# Patient Record
Sex: FEMALE | Race: OTHER | NOT HISPANIC OR LATINO | URBAN - METROPOLITAN AREA
[De-identification: names, ages, dates, MRNs, and addresses within clinical notes are randomized per-mention and may not be internally consistent; named-entity substitution may affect disease eponyms.]

---

## 2017-02-22 ENCOUNTER — OUTPATIENT (OUTPATIENT)
Dept: OUTPATIENT SERVICES | Facility: HOSPITAL | Age: 62
LOS: 1 days | End: 2017-02-22

## 2017-02-22 VITALS
WEIGHT: 169.98 LBS | HEIGHT: 61 IN | HEART RATE: 72 BPM | RESPIRATION RATE: 16 BRPM | DIASTOLIC BLOOD PRESSURE: 78 MMHG | TEMPERATURE: 97 F | SYSTOLIC BLOOD PRESSURE: 130 MMHG

## 2017-02-22 DIAGNOSIS — K80.50 CALCULUS OF BILE DUCT WITHOUT CHOLANGITIS OR CHOLECYSTITIS WITHOUT OBSTRUCTION: ICD-10-CM

## 2017-02-22 DIAGNOSIS — Z98.891 HISTORY OF UTERINE SCAR FROM PREVIOUS SURGERY: Chronic | ICD-10-CM

## 2017-02-22 DIAGNOSIS — K21.9 GASTRO-ESOPHAGEAL REFLUX DISEASE WITHOUT ESOPHAGITIS: ICD-10-CM

## 2017-02-22 LAB
BASOPHILS # BLD AUTO: 0.03 K/UL — SIGNIFICANT CHANGE UP (ref 0–0.2)
BASOPHILS NFR BLD AUTO: 0.4 % — SIGNIFICANT CHANGE UP (ref 0–2)
BLD GP AB SCN SERPL QL: NEGATIVE — SIGNIFICANT CHANGE UP
BUN SERPL-MCNC: 15 MG/DL — SIGNIFICANT CHANGE UP (ref 7–23)
CALCIUM SERPL-MCNC: 8.7 MG/DL — SIGNIFICANT CHANGE UP (ref 8.4–10.5)
CHLORIDE SERPL-SCNC: 103 MMOL/L — SIGNIFICANT CHANGE UP (ref 98–107)
CO2 SERPL-SCNC: 27 MMOL/L — SIGNIFICANT CHANGE UP (ref 22–31)
CREAT SERPL-MCNC: 0.63 MG/DL — SIGNIFICANT CHANGE UP (ref 0.5–1.3)
EOSINOPHIL # BLD AUTO: 0.28 K/UL — SIGNIFICANT CHANGE UP (ref 0–0.5)
EOSINOPHIL NFR BLD AUTO: 3.4 % — SIGNIFICANT CHANGE UP (ref 0–6)
GLUCOSE SERPL-MCNC: 95 MG/DL — SIGNIFICANT CHANGE UP (ref 70–99)
HCT VFR BLD CALC: 39.2 % — SIGNIFICANT CHANGE UP (ref 34.5–45)
HGB BLD-MCNC: 12.6 G/DL — SIGNIFICANT CHANGE UP (ref 11.5–15.5)
IMM GRANULOCYTES NFR BLD AUTO: 0.1 % — SIGNIFICANT CHANGE UP (ref 0–1.5)
LYMPHOCYTES # BLD AUTO: 2.6 K/UL — SIGNIFICANT CHANGE UP (ref 1–3.3)
LYMPHOCYTES # BLD AUTO: 31.8 % — SIGNIFICANT CHANGE UP (ref 13–44)
MCHC RBC-ENTMCNC: 26.3 PG — LOW (ref 27–34)
MCHC RBC-ENTMCNC: 32.1 % — SIGNIFICANT CHANGE UP (ref 32–36)
MCV RBC AUTO: 81.8 FL — SIGNIFICANT CHANGE UP (ref 80–100)
MONOCYTES # BLD AUTO: 0.72 K/UL — SIGNIFICANT CHANGE UP (ref 0–0.9)
MONOCYTES NFR BLD AUTO: 8.8 % — SIGNIFICANT CHANGE UP (ref 2–14)
NEUTROPHILS # BLD AUTO: 4.54 K/UL — SIGNIFICANT CHANGE UP (ref 1.8–7.4)
NEUTROPHILS NFR BLD AUTO: 55.5 % — SIGNIFICANT CHANGE UP (ref 43–77)
PLATELET # BLD AUTO: 287 K/UL — SIGNIFICANT CHANGE UP (ref 150–400)
PMV BLD: 9 FL — SIGNIFICANT CHANGE UP (ref 7–13)
POTASSIUM SERPL-MCNC: 4.1 MMOL/L — SIGNIFICANT CHANGE UP (ref 3.5–5.3)
POTASSIUM SERPL-SCNC: 4.1 MMOL/L — SIGNIFICANT CHANGE UP (ref 3.5–5.3)
RBC # BLD: 4.79 M/UL — SIGNIFICANT CHANGE UP (ref 3.8–5.2)
RBC # FLD: 13.6 % — SIGNIFICANT CHANGE UP (ref 10.3–14.5)
RH IG SCN BLD-IMP: POSITIVE — SIGNIFICANT CHANGE UP
SODIUM SERPL-SCNC: 143 MMOL/L — SIGNIFICANT CHANGE UP (ref 135–145)
WBC # BLD: 8.18 K/UL — SIGNIFICANT CHANGE UP (ref 3.8–10.5)
WBC # FLD AUTO: 8.18 K/UL — SIGNIFICANT CHANGE UP (ref 3.8–10.5)

## 2017-02-22 RX ORDER — PANTOPRAZOLE SODIUM 20 MG/1
1 TABLET, DELAYED RELEASE ORAL
Qty: 0 | Refills: 0 | COMMUNITY

## 2017-02-22 RX ORDER — SODIUM CHLORIDE 9 MG/ML
1000 INJECTION, SOLUTION INTRAVENOUS
Qty: 0 | Refills: 0 | Status: DISCONTINUED | OUTPATIENT
Start: 2017-03-03 | End: 2017-03-03

## 2017-02-22 RX ORDER — ERGOCALCIFEROL 1.25 MG/1
1 CAPSULE ORAL
Qty: 0 | Refills: 0 | COMMUNITY

## 2017-02-22 RX ORDER — OMALIZUMAB 150 MG/ML
0 INJECTION, SOLUTION SUBCUTANEOUS
Qty: 0 | Refills: 0 | COMMUNITY

## 2017-02-22 RX ORDER — FAMOTIDINE 10 MG/ML
1 INJECTION INTRAVENOUS
Qty: 0 | Refills: 0 | COMMUNITY

## 2017-02-22 NOTE — H&P PST ADULT - PMH
Calculus of bile duct w/o obstruction, cholangitis, or cholecystitis    GERD (gastroesophageal reflux disease)

## 2017-02-22 NOTE — H&P PST ADULT - NEGATIVE OPHTHALMOLOGIC SYMPTOMS
no blurred vision L/no pain R/no discharge R/no diplopia/no irritation L/no lacrimation L/no loss of vision R/no lacrimation R/no irritation R/no loss of vision L/no photophobia/no discharge L/no pain L/no blurred vision R

## 2017-02-22 NOTE — H&P PST ADULT - NEGATIVE NEUROLOGICAL SYMPTOMS
no transient paralysis/no syncope/no headache/no weakness/no loss of consciousness/no focal seizures/no paresthesias/no tremors/no vertigo/no facial palsy/no confusion/no generalized seizures/no loss of sensation/no hemiparesis/no difficulty walking

## 2017-02-22 NOTE — H&P PST ADULT - NEGATIVE MUSCULOSKELETAL SYMPTOMS
no neck pain/no joint swelling/no back pain/no muscle cramps/no arm pain L/no arthritis/no stiffness/no arthralgia/no myalgia/no arm pain R/no leg pain L/no leg pain R/no muscle weakness

## 2017-02-22 NOTE — H&P PST ADULT - FAMILY HISTORY
Father  Still living? Yes, Estimated age: Age Unknown  Family history of diabetes mellitus, Age at diagnosis: Age Unknown

## 2017-02-22 NOTE — H&P PST ADULT - HISTORY OF PRESENT ILLNESS
62 yo female with PMH of GERD .  Pt states she started to experience abdominal pain Jan 2016.  Endoscopy done Jan 2016, showed gastritis.  Abdominal ultrasound done, showed multiple stones in bile duct.   Pt scheduled for laparoscopic cholecystectomy, possible open for biliary on 3/3/17. 60 yo female with PMH of GERD presents to pre surgical testing.  Pt states she started to experience abdominal pain Jan 2016.  Went to GI doctor, endoscopy done Jan 2016, showed gastritis.  Abdominal ultrasound done, showed multiple stones in bile duct.  Pt scheduled for laparoscopic cholecystectomy, possible open for biliary on 3/3/17.

## 2017-02-22 NOTE — H&P PST ADULT - NEGATIVE ENMT SYMPTOMS
no dysphagia/no tinnitus/no ear pain/no throat pain/no vertigo/no abnormal taste sensation/no hearing difficulty/no gum bleeding/no dry mouth/no nasal obstruction

## 2017-02-22 NOTE — H&P PST ADULT - MUSCULOSKELETAL
details… detailed exam no calf tenderness/no joint swelling/no joint erythema/normal strength/ROM intact/no joint warmth

## 2017-02-22 NOTE — H&P PST ADULT - PROBLEM SELECTOR PLAN 1
Pt scheduled for laparoscopic cholecystectomy, possible open for biliary on 3/3/17.  Pre op instructions including chlorhexidine wash given and pt able to verbalize understanding.

## 2017-02-22 NOTE — H&P PST ADULT - NEGATIVE CARDIOVASCULAR SYMPTOMS
no chest pain/no peripheral edema/no orthopnea/no dyspnea on exertion/no claudication/no paroxysmal nocturnal dyspnea/no palpitations

## 2017-02-22 NOTE — H&P PST ADULT - RS GEN PE MLT RESP DETAILS PC
no wheezes/no intercostal retractions/no rales/airway patent/no subcutaneous emphysema/no chest wall tenderness/respirations non-labored/no rhonchi/breath sounds equal/clear to auscultation bilaterally/good air movement

## 2017-02-22 NOTE — H&P PST ADULT - NSANTHOSAYNRD_GEN_A_CORE
No. ARVIND screening performed.  STOP BANG Legend: 0-2 = LOW Risk; 3-4 = INTERMEDIATE Risk; 5-8 = HIGH Risk

## 2017-02-23 LAB
ALBUMIN SERPL ELPH-MCNC: 3.9 G/DL — SIGNIFICANT CHANGE UP (ref 3.3–5)
ALP SERPL-CCNC: 108 U/L — SIGNIFICANT CHANGE UP (ref 40–120)
ALT FLD-CCNC: 18 U/L — SIGNIFICANT CHANGE UP (ref 4–33)
AST SERPL-CCNC: 22 U/L — SIGNIFICANT CHANGE UP (ref 4–32)
BILIRUB DIRECT SERPL-MCNC: 0.1 MG/DL — SIGNIFICANT CHANGE UP (ref 0.1–0.2)
BILIRUB SERPL-MCNC: 0.6 MG/DL — SIGNIFICANT CHANGE UP (ref 0.2–1.2)
PROT SERPL-MCNC: 7.3 G/DL — SIGNIFICANT CHANGE UP (ref 6–8.3)

## 2017-03-02 ENCOUNTER — RESULT REVIEW (OUTPATIENT)
Age: 62
End: 2017-03-02

## 2017-03-03 ENCOUNTER — OUTPATIENT (OUTPATIENT)
Dept: OUTPATIENT SERVICES | Facility: HOSPITAL | Age: 62
LOS: 1 days | Discharge: HOME | End: 2017-03-03
Payer: COMMERCIAL

## 2017-03-03 ENCOUNTER — TRANSCRIPTION ENCOUNTER (OUTPATIENT)
Age: 62
End: 2017-03-03

## 2017-03-03 VITALS
TEMPERATURE: 98 F | RESPIRATION RATE: 18 BRPM | HEART RATE: 73 BPM | OXYGEN SATURATION: 97 % | SYSTOLIC BLOOD PRESSURE: 127 MMHG | DIASTOLIC BLOOD PRESSURE: 59 MMHG

## 2017-03-03 VITALS
SYSTOLIC BLOOD PRESSURE: 129 MMHG | HEIGHT: 60 IN | HEART RATE: 78 BPM | TEMPERATURE: 98 F | DIASTOLIC BLOOD PRESSURE: 63 MMHG | WEIGHT: 169.98 LBS | OXYGEN SATURATION: 96 % | RESPIRATION RATE: 12 BRPM

## 2017-03-03 DIAGNOSIS — Z98.891 HISTORY OF UTERINE SCAR FROM PREVIOUS SURGERY: Chronic | ICD-10-CM

## 2017-03-03 DIAGNOSIS — K80.50 CALCULUS OF BILE DUCT WITHOUT CHOLANGITIS OR CHOLECYSTITIS WITHOUT OBSTRUCTION: ICD-10-CM

## 2017-03-03 LAB — RH IG SCN BLD-IMP: POSITIVE — SIGNIFICANT CHANGE UP

## 2017-03-03 PROCEDURE — 88304 TISSUE EXAM BY PATHOLOGIST: CPT | Mod: 26

## 2017-03-03 RX ORDER — SODIUM CHLORIDE 9 MG/ML
1000 INJECTION, SOLUTION INTRAVENOUS
Qty: 0 | Refills: 0 | Status: DISCONTINUED | OUTPATIENT
Start: 2017-03-03 | End: 2017-03-04

## 2017-03-03 RX ORDER — ACETAMINOPHEN WITH CODEINE 300MG-30MG
1 TABLET ORAL
Qty: 12 | Refills: 0 | OUTPATIENT
Start: 2017-03-03 | End: 2017-03-06

## 2017-03-03 RX ORDER — OXYCODONE HYDROCHLORIDE 5 MG/1
1 TABLET ORAL
Qty: 15 | Refills: 0 | OUTPATIENT
Start: 2017-03-03

## 2017-03-03 RX ADMIN — SODIUM CHLORIDE 100 MILLILITER(S): 9 INJECTION, SOLUTION INTRAVENOUS at 11:09

## 2017-03-03 NOTE — ASU DISCHARGE PLAN (ADULT/PEDIATRIC). - MEDICATION SUMMARY - MEDICATIONS TO TAKE
I will START or STAY ON the medications listed below when I get home from the hospital:    Tylenol with Codeine #3 300 mg-30 mg oral tablet  -- 1 tab(s) by mouth every 6 hours MDD:4  -- Caution federal law prohibits the transfer of this drug to any person other  than the person for whom it was prescribed.  May cause drowsiness.  Alcohol may intensify this effect.  Use care when operating dangerous machinery.  This product contains acetaminophen.  Do not use  with any other product containing acetaminophen to prevent possible liver damage.  Using more of this medication than prescribed may cause serious breathing problems.    -- Indication: For Pain    famotidine 40 mg oral tablet  -- 1 tab(s) by mouth once a day in am  -- Indication: For GERD    Xolair 150 mg subcutaneous injection  --  subcutaneous once a month  -- Indication: For Immunosuppressive drug    pantoprazole 40 mg oral delayed release tablet  -- 1 tab(s) by mouth once a day in am  -- Indication: For GERD    multivitamin  -- 1 tab(s) by mouth once a day in am. Last dose 2/23/17  -- Indication: For Vitamin    Vitamin D2 50,000 intl units (1.25 mg) oral capsule  -- 1 cap(s) by mouth once a week in am on Wednesdays  -- Indication: For Vitamin

## 2017-03-03 NOTE — ASU DISCHARGE PLAN (ADULT/PEDIATRIC). - NURSING INSTRUCTIONS
You received intravenous tylenol in the operating room at 9:20am. You may take additional tylenol products after 3:20pm. You also received intravenous toradol (NSAID) in the operating room at 10:15am. You may take additional NSAIDs (advil/aleve/ibuprofen/motrin) after 4:15pm.

## 2017-03-03 NOTE — ASU DISCHARGE PLAN (ADULT/PEDIATRIC). - DRESSING FT
You may shower regularly after 24 hours. Leave steristrips on. You may shower regularly after 24 hours. Leave steri strips on.

## 2017-03-03 NOTE — ASU DISCHARGE PLAN (ADULT/PEDIATRIC). - NOTIFY
Pain not relieved by Medications/Fever greater than 101/Bleeding that does not stop/Persistent Nausea and Vomiting/Unable to Urinate

## 2017-03-09 LAB — SURGICAL PATHOLOGY STUDY: SIGNIFICANT CHANGE UP

## 2019-01-22 NOTE — ASU PATIENT PROFILE, ADULT - ABLE TO REACH PT
----- Message from Radha Allen sent at 1/22/2019  3:25 PM CST -----  Contact: [facility] Lorena [phone]  Lorena is calling to notify  that pt has a new wound on leg, and would like orders for wound care. Please call and advise.    yes

## 2020-07-24 NOTE — BRIEF OPERATIVE NOTE - SURGICAL END DATE/TIME
Results of Renal US for after biopsy resulted. Writer updated Nephrology PA.     [phone] DEMETRIUS Cervantes: Renal US showed small collection of Right kidney possibly from biopsy. Writer will monitor. Please advise if further interventions. JANE Murray    New orders placed.    03-Mar-2017

## 2022-09-23 PROBLEM — K80.50 CALCULUS OF BILE DUCT WITHOUT CHOLANGITIS OR CHOLECYSTITIS WITHOUT OBSTRUCTION: Chronic | Status: ACTIVE | Noted: 2017-02-22

## 2022-09-23 PROBLEM — K21.9 GASTRO-ESOPHAGEAL REFLUX DISEASE WITHOUT ESOPHAGITIS: Chronic | Status: ACTIVE | Noted: 2017-02-22

## 2022-10-14 ENCOUNTER — APPOINTMENT (OUTPATIENT)
Dept: OTOLARYNGOLOGY | Facility: CLINIC | Age: 67
End: 2022-10-14

## 2022-11-04 ENCOUNTER — APPOINTMENT (OUTPATIENT)
Dept: OTOLARYNGOLOGY | Facility: CLINIC | Age: 67
End: 2022-11-04

## 2023-11-17 ENCOUNTER — APPOINTMENT (OUTPATIENT)
Dept: PULMONOLOGY | Facility: CLINIC | Age: 68
End: 2023-11-17
Payer: MEDICARE

## 2023-11-17 VITALS
SYSTOLIC BLOOD PRESSURE: 144 MMHG | TEMPERATURE: 97.2 F | DIASTOLIC BLOOD PRESSURE: 70 MMHG | OXYGEN SATURATION: 98 % | WEIGHT: 171 LBS | BODY MASS INDEX: 32.7 KG/M2 | HEART RATE: 84 BPM | HEIGHT: 60.5 IN

## 2023-11-17 DIAGNOSIS — I10 ESSENTIAL (PRIMARY) HYPERTENSION: ICD-10-CM

## 2023-11-17 DIAGNOSIS — R06.02 SHORTNESS OF BREATH: ICD-10-CM

## 2023-11-17 DIAGNOSIS — K50.90 CROHN'S DISEASE, UNSPECIFIED, W/OUT COMPLICATIONS: ICD-10-CM

## 2023-11-17 DIAGNOSIS — R06.81 APNEA, NOT ELSEWHERE CLASSIFIED: ICD-10-CM

## 2023-11-17 DIAGNOSIS — R06.83 SNORING: ICD-10-CM

## 2023-11-17 PROCEDURE — 99204 OFFICE O/P NEW MOD 45 MIN: CPT

## 2024-01-23 ENCOUNTER — APPOINTMENT (OUTPATIENT)
Dept: INTERNAL MEDICINE | Facility: CLINIC | Age: 69
End: 2024-01-23

## 2024-01-23 ENCOUNTER — APPOINTMENT (OUTPATIENT)
Dept: PULMONOLOGY | Facility: CLINIC | Age: 69
End: 2024-01-23

## 2024-04-22 ENCOUNTER — APPOINTMENT (OUTPATIENT)
Dept: PULMONOLOGY | Facility: CLINIC | Age: 69
End: 2024-04-22

## 2024-08-12 ENCOUNTER — APPOINTMENT (OUTPATIENT)
Dept: ORTHOPEDIC SURGERY | Facility: CLINIC | Age: 69
End: 2024-08-12
Payer: MEDICARE

## 2024-08-12 VITALS — HEIGHT: 61 IN | WEIGHT: 174 LBS | BODY MASS INDEX: 32.85 KG/M2

## 2024-08-12 DIAGNOSIS — M48.07 SPINAL STENOSIS, LUMBOSACRAL REGION: ICD-10-CM

## 2024-08-12 PROCEDURE — 99204 OFFICE O/P NEW MOD 45 MIN: CPT

## 2024-08-12 NOTE — ADDENDUM
[FreeTextEntry1] : This note was written by Adryan Blank on 08/12/2024 acting as scribe for Dr. Mohit Marquez M.D.  I, Mohit Marquez MD, have read and attest that all the information, medical decision making and discharge instructions within are true and accurate.

## 2024-08-12 NOTE — PHYSICAL EXAM
[Normal] : Gait: normal [Rose's Sign] : negative Rose's sign [Pronator Drift] : negative pronator drift [SLR] : negative straight leg raise [de-identified] : 5 out of 5 motor strength, sensation is intact and symmetrical full range of motion flexion extension and rotation, no palpatory tenderness full range of motion of hips knees shoulders and elbows (all four extremities), no atrophy, negative straight leg raise, no pathological reflexes, no swelling, normal ambulation, no apparent distress skin is intact, no palpable lymph nodes, no upper or lower extremity instability, alert and oriented x3 and normal mood. Normal finger-to nose test.  No upper motor neuron findings. [de-identified] : I reviewed, interpreted and clinically correlated the following outside imaging studies, MRI Lumbar 7/25/24  ([facility]) IMPRESSION:    Minimal lumbar levoscoliosis. Suspect subtle L4-5 spondylolisthesis.    Mid lumbar degenerative disc change.  Mid/lower lumbar degenerative facet disease.   Subtle left posterior L4-5 disc protrusion with caudal extension which abuts the left anterior thecal sac without definite nerve root involvement.   Moderate L4-5 spinal canal narrowing.   L4-5 foraminal disc extension with question of minimal right L4 nerve root compression.         CLINICAL INDICATION:  Chronic radiating low back pain.   MRI OF THE LUMBAR SPINE WITHOUT IV CONTRAST:   TECHNIQUE: Multiplanar multisequence MRI imaging of the lumbar spine was performed without the administration of intravenous contrast.    COMPARISON:  None.   FINDINGS: L4-5 is designated just below the iliac crest.   ALIGNMENT:  Suspect subtle L4-5 spondylolisthesis. Minimal lumbar levoscoliosis.   VERTEBRAE:   Vertebral height is preserved. Subtle type I Modic signal at T10-11, T11-12. Slight type II Modic signal at T12-L1. Small vertebral body hemangiomas at L1, L5, S1. The posterior elements are intact.   DISCS:  Loss of disc height at T11-12, L4-5. Suspect disc desiccation at T11-12, L2-3 - L4-5.   CONUS MEDULLARIS AND CAUDA EQUINA:  The conus tip is seen at T12. Normal distal cord. No intradural abnormality.   PARAVERTEBRAL SOFT TISSUES AND VISUALIZED RETROPERITONEUM:  No paraspinal abnormality.   EVALUATION OF INDIVIDUAL LEVELS DEMONSTRATES:    T10-11:  No spinal canal or neural foraminal stenosis.   T11-12:  Central T11-12 disc protrusion. Minimal anterior thecal sac effacement.   T12-L1, L1-2:   No spinal canal or neuroforaminal stenosis.   L2-3:   Minimal central L2-3 disc bulge. Minimal right L2-3 facet hypertrophy. L3-4:  Minimal central L3-4 disc bulge. Moderate left greater than right L3-4 facet hypertrophy.   L4-5:  Marked central L4-5 disc bulge abuts the anterior thecal sac. Slight asymmetric caudal extension, left side. Moderate bilateral facet hypertrophy. Consequent spinal canal narrowing. Bilateral foraminal disc extension with right greater than left foraminal narrowing.   L5-S1:  Intact L5-S1 disc. Minimal bilateral facet hypertrophy.   LIMITED EVALUATION OF UPPER SACRUM AND SACROILIAC JOINTS: Degenerative change, bilateral SI joints.

## 2024-08-12 NOTE — DISCUSSION/SUMMARY
[Medication Risks Reviewed] : Medication risks reviewed [Surgical risks reviewed] : Surgical risks reviewed [de-identified] : L4-5 moderate stenosis. L4-5 spondylolisthesis. Discussed all options. Referred to Dr. Dada Dupont for pain management. Discussed L4-5 decompression. Risks of surgery include infection, dural tear, nerve root injury, reherniation, future back pain, future leg pain, retained fragment, hematoma, urinary retention, worsening leg symptoms, footdrop, anesthetic risks, blood transfusion risks, positioning pain, visceral and vascular injury, deep vein thrombosis, pulmonary embolus, and death. All risks were explained not exclusive to the ones mentioned alternatives were discussed and all questions were answered the patient agrees and understands the above and is in complete agreement with the plan.  All options discussed including rest, medicine, home exercise, acupuncture, Chiropractic care, Physical Therapy, Pain management, and last resort surgery. All questions were answered, all alternatives discussed, and the patient is in complete agreement with the treatment plan which the patient contributed to and discussed with me through the shared decision-making process. Follow-up appointment as instructed. Any issues and the patient will call or come in sooner. Son agrees with plan.

## 2024-08-12 NOTE — HISTORY OF PRESENT ILLNESS
[Stable] : stable [de-identified] : 68 year old female presents for initial evaluation of lower back pain x 2 years, which has worsened.  She states she may have hurt her back while on a trip where she did a lot of excessive walking, standing and bending.  She states that the pain radiates down the bilateral lateral hips, down the B/L LE posteriorly to the feet, R>L.  Has tingling of both legs.  Walking and standing aggravates the pain.  Can not take NSAIDs due to Crohn's disease.  Takes tylenol for the pain.  Has tried PT in 2022 which helped at the time but temporarily.  Denies REX. Has MRI Lumbar. PMHx: HTN She is retired.  No fever, chills, sweats, nausea/vomiting. No bowel or bladder dysfunction, no recent weight loss or gain. No night pain. This history is in addition to the intake form that I personally reviewed.

## 2024-09-22 ENCOUNTER — NON-APPOINTMENT (OUTPATIENT)
Age: 69
End: 2024-09-22

## 2024-09-23 ENCOUNTER — APPOINTMENT (OUTPATIENT)
Dept: ORTHOPEDIC SURGERY | Facility: CLINIC | Age: 69
End: 2024-09-23
Payer: MEDICARE

## 2024-09-23 VITALS
DIASTOLIC BLOOD PRESSURE: 81 MMHG | OXYGEN SATURATION: 99 % | BODY MASS INDEX: 32.85 KG/M2 | SYSTOLIC BLOOD PRESSURE: 124 MMHG | HEART RATE: 83 BPM | HEIGHT: 61 IN | WEIGHT: 174 LBS

## 2024-09-23 DIAGNOSIS — M48.07 SPINAL STENOSIS, LUMBOSACRAL REGION: ICD-10-CM

## 2024-09-23 PROCEDURE — 99214 OFFICE O/P EST MOD 30 MIN: CPT

## 2024-09-23 NOTE — DISCUSSION/SUMMARY
[Medication Risks Reviewed] : Medication risks reviewed [Surgical risks reviewed] : Surgical risks reviewed [de-identified] : L4-5 moderate stenosis. L4-5 spondylolisthesis. Feeling better after REX with Dr. Dada Dupont. Discussed all options. F/U 8 weeks. She will see Dr. Dada Dupont again for pain management. Discussed L4-5 decompression, however she will avoid surgery as of now as she feels better after her 1st REX. Risks of surgery include infection, dural tear, nerve root injury, reherniation, future back pain, future leg pain, retained fragment, hematoma, urinary retention, worsening leg symptoms, footdrop, anesthetic risks, blood transfusion risks, positioning pain, visceral and vascular injury, deep vein thrombosis, pulmonary embolus, and death. All risks were explained not exclusive to the ones mentioned alternatives were discussed and all questions were answered the patient agrees and understands the above and is in complete agreement with the plan.  All options discussed including rest, medicine, home exercise, acupuncture, Chiropractic care, Physical Therapy, Pain management, and last resort surgery. All questions were answered, all alternatives discussed, and the patient is in complete agreement with the treatment plan which the patient contributed to and discussed with me through the shared decision-making process. Follow-up appointment as instructed. Any issues and the patient will call or come in sooner. Son agrees with plan.

## 2024-09-23 NOTE — HISTORY OF PRESENT ILLNESS
[Stable] : stable [de-identified] : 69 year old female presents for follow up evaluation of lower back pain x 2 years. She states she may have hurt her back while on a trip where she did a lot of excessive walking, standing and bending.  She states that the pain radiates down the bilateral lateral hips, down the B/L LE posteriorly to the feet, R>L.  Has tingling of both legs.  Walking and standing aggravates the pain.  Can not take NSAIDs due to Crohn's disease.  Takes tylenol for the pain.  Has tried PT in 2022 which helped at the time but temporarily.  Was referred to pain management at last visit. S/P LESI x on 8/29/24 with Dr. Dupont and states she felt improvement, currently her pain is 6-7/10.  She was also referred to PT by Dr. Dupont, she is going to start it next week. She was also recommended another LESI. Has MRI Lumbar. PMHx: HTN, Crohn's Disease  She is retired.  No fever, chills, sweats, nausea/vomiting. No bowel or bladder dysfunction, no recent weight loss or gain. No night pain. This history is in addition to the intake form that I personally reviewed.

## 2024-09-23 NOTE — ADDENDUM
[FreeTextEntry1] : This note was written by Adryan Blank on 09/23/2024 acting as scribe for Dr. Mohit Marquez M.D.  I, Mohit Marquez MD, have read and attest that all the information, medical decision making and discharge instructions within are true and accurate.

## 2024-09-23 NOTE — PHYSICAL EXAM
[Normal] : Gait: normal [Rose's Sign] : negative Rose's sign [Pronator Drift] : negative pronator drift [SLR] : negative straight leg raise [de-identified] : 5 out of 5 motor strength, sensation is intact and symmetrical full range of motion flexion extension and rotation, no palpatory tenderness full range of motion of hips knees shoulders and elbows (all four extremities), no atrophy, negative straight leg raise, no pathological reflexes, no swelling, normal ambulation, no apparent distress skin is intact, no palpable lymph nodes, no upper or lower extremity instability, alert and oriented x3 and normal mood. Normal finger-to nose test.  No upper motor neuron findings. [de-identified] : I reviewed, interpreted and clinically correlated the following outside imaging studies, MRI Lumbar 7/25/24  ([facility] [place]) IMPRESSION:    Minimal lumbar levoscoliosis. Suspect subtle L4-5 spondylolisthesis.    Mid lumbar degenerative disc change.  Mid/lower lumbar degenerative facet disease.   Subtle left posterior L4-5 disc protrusion with caudal extension which abuts the left anterior thecal sac without definite nerve root involvement.   Moderate L4-5 spinal canal narrowing.   L4-5 foraminal disc extension with question of minimal right L4 nerve root compression.         CLINICAL INDICATION:  Chronic radiating low back pain.   MRI OF THE LUMBAR SPINE WITHOUT IV CONTRAST:   TECHNIQUE: Multiplanar multisequence MRI imaging of the lumbar spine was performed without the administration of intravenous contrast.    COMPARISON:  None.   FINDINGS: L4-5 is designated just below the iliac crest.   ALIGNMENT:  Suspect subtle L4-5 spondylolisthesis. Minimal lumbar levoscoliosis.   VERTEBRAE:   Vertebral height is preserved. Subtle type I Modic signal at T10-11, T11-12. Slight type II Modic signal at T12-L1. Small vertebral body hemangiomas at L1, L5, S1. The posterior elements are intact.   DISCS:  Loss of disc height at T11-12, L4-5. Suspect disc desiccation at T11-12, L2-3 - L4-5.   CONUS MEDULLARIS AND CAUDA EQUINA:  The conus tip is seen at T12. Normal distal cord. No intradural abnormality.   PARAVERTEBRAL SOFT TISSUES AND VISUALIZED RETROPERITONEUM:  No paraspinal abnormality.   EVALUATION OF INDIVIDUAL LEVELS DEMONSTRATES:    T10-11:  No spinal canal or neural foraminal stenosis.   T11-12:  Central T11-12 disc protrusion. Minimal anterior thecal sac effacement.   T12-L1, L1-2:   No spinal canal or neuroforaminal stenosis.   L2-3:   Minimal central L2-3 disc bulge. Minimal right L2-3 facet hypertrophy. L3-4:  Minimal central L3-4 disc bulge. Moderate left greater than right L3-4 facet hypertrophy.   L4-5:  Marked central L4-5 disc bulge abuts the anterior thecal sac. Slight asymmetric caudal extension, left side. Moderate bilateral facet hypertrophy. Consequent spinal canal narrowing. Bilateral foraminal disc extension with right greater than left foraminal narrowing.   L5-S1:  Intact L5-S1 disc. Minimal bilateral facet hypertrophy.   LIMITED EVALUATION OF UPPER SACRUM AND SACROILIAC JOINTS: Degenerative change, bilateral SI joints.

## 2024-11-25 ENCOUNTER — APPOINTMENT (OUTPATIENT)
Dept: ORTHOPEDIC SURGERY | Facility: CLINIC | Age: 69
End: 2024-11-25
Payer: MEDICARE

## 2024-11-25 VITALS — WEIGHT: 172 LBS | BODY MASS INDEX: 32.47 KG/M2 | HEIGHT: 61 IN

## 2024-11-25 DIAGNOSIS — M48.07 SPINAL STENOSIS, LUMBOSACRAL REGION: ICD-10-CM

## 2024-11-25 DIAGNOSIS — M43.16 SPONDYLOLISTHESIS, LUMBAR REGION: ICD-10-CM

## 2024-11-25 PROCEDURE — 99214 OFFICE O/P EST MOD 30 MIN: CPT

## 2024-11-25 PROCEDURE — 72100 X-RAY EXAM L-S SPINE 2/3 VWS: CPT

## 2025-01-06 ENCOUNTER — APPOINTMENT (OUTPATIENT)
Dept: ORTHOPEDIC SURGERY | Facility: CLINIC | Age: 70
End: 2025-01-06

## 2025-02-13 ENCOUNTER — APPOINTMENT (OUTPATIENT)
Dept: ORTHOPEDIC SURGERY | Facility: CLINIC | Age: 70
End: 2025-02-13
Payer: MEDICARE

## 2025-02-13 VITALS
DIASTOLIC BLOOD PRESSURE: 83 MMHG | HEIGHT: 61 IN | HEART RATE: 81 BPM | OXYGEN SATURATION: 98 % | BODY MASS INDEX: 32.47 KG/M2 | SYSTOLIC BLOOD PRESSURE: 138 MMHG | WEIGHT: 172 LBS

## 2025-02-13 DIAGNOSIS — M48.07 SPINAL STENOSIS, LUMBOSACRAL REGION: ICD-10-CM

## 2025-02-13 DIAGNOSIS — M43.16 SPONDYLOLISTHESIS, LUMBAR REGION: ICD-10-CM

## 2025-02-13 PROCEDURE — 99214 OFFICE O/P EST MOD 30 MIN: CPT

## 2025-05-05 ENCOUNTER — NON-APPOINTMENT (OUTPATIENT)
Age: 70
End: 2025-05-05

## 2025-05-05 ENCOUNTER — APPOINTMENT (OUTPATIENT)
Dept: ORTHOPEDIC SURGERY | Facility: CLINIC | Age: 70
End: 2025-05-05
Payer: MEDICARE

## 2025-05-05 VITALS
SYSTOLIC BLOOD PRESSURE: 131 MMHG | DIASTOLIC BLOOD PRESSURE: 75 MMHG | OXYGEN SATURATION: 99 % | HEIGHT: 61 IN | BODY MASS INDEX: 33.42 KG/M2 | WEIGHT: 177 LBS | HEART RATE: 94 BPM

## 2025-05-05 DIAGNOSIS — M48.07 SPINAL STENOSIS, LUMBOSACRAL REGION: ICD-10-CM

## 2025-05-05 DIAGNOSIS — M43.16 SPONDYLOLISTHESIS, LUMBAR REGION: ICD-10-CM

## 2025-05-05 PROCEDURE — 99214 OFFICE O/P EST MOD 30 MIN: CPT

## 2025-05-08 ENCOUNTER — APPOINTMENT (OUTPATIENT)
Dept: MRI IMAGING | Facility: CLINIC | Age: 70
End: 2025-05-08

## 2025-05-08 ENCOUNTER — NON-APPOINTMENT (OUTPATIENT)
Age: 70
End: 2025-05-08

## 2025-05-19 ENCOUNTER — OUTPATIENT (OUTPATIENT)
Dept: OUTPATIENT SERVICES | Facility: HOSPITAL | Age: 70
LOS: 1 days | End: 2025-05-19
Payer: COMMERCIAL

## 2025-05-19 ENCOUNTER — APPOINTMENT (OUTPATIENT)
Dept: MRI IMAGING | Facility: IMAGING CENTER | Age: 70
End: 2025-05-19
Payer: MEDICARE

## 2025-05-19 DIAGNOSIS — Z98.891 HISTORY OF UTERINE SCAR FROM PREVIOUS SURGERY: Chronic | ICD-10-CM

## 2025-05-19 DIAGNOSIS — M43.16 SPONDYLOLISTHESIS, LUMBAR REGION: ICD-10-CM

## 2025-05-19 PROCEDURE — 72148 MRI LUMBAR SPINE W/O DYE: CPT | Mod: 26

## 2025-05-19 PROCEDURE — 72148 MRI LUMBAR SPINE W/O DYE: CPT

## 2025-05-29 ENCOUNTER — APPOINTMENT (OUTPATIENT)
Dept: ORTHOPEDIC SURGERY | Facility: CLINIC | Age: 70
End: 2025-05-29
Payer: MEDICARE

## 2025-05-29 VITALS — WEIGHT: 173 LBS | BODY MASS INDEX: 32.66 KG/M2 | HEIGHT: 61 IN

## 2025-05-29 DIAGNOSIS — M43.16 SPONDYLOLISTHESIS, LUMBAR REGION: ICD-10-CM

## 2025-05-29 DIAGNOSIS — M48.07 SPINAL STENOSIS, LUMBOSACRAL REGION: ICD-10-CM

## 2025-05-29 PROCEDURE — 99214 OFFICE O/P EST MOD 30 MIN: CPT

## 2025-06-25 ENCOUNTER — OUTPATIENT (OUTPATIENT)
Dept: OUTPATIENT SERVICES | Facility: HOSPITAL | Age: 70
LOS: 1 days | End: 2025-06-25

## 2025-06-25 VITALS
RESPIRATION RATE: 18 BRPM | DIASTOLIC BLOOD PRESSURE: 80 MMHG | SYSTOLIC BLOOD PRESSURE: 122 MMHG | HEIGHT: 60 IN | WEIGHT: 175.05 LBS | TEMPERATURE: 99 F | OXYGEN SATURATION: 98 % | HEART RATE: 87 BPM

## 2025-06-25 DIAGNOSIS — Z90.49 ACQUIRED ABSENCE OF OTHER SPECIFIED PARTS OF DIGESTIVE TRACT: Chronic | ICD-10-CM

## 2025-06-25 DIAGNOSIS — M48.07 SPINAL STENOSIS, LUMBOSACRAL REGION: ICD-10-CM

## 2025-06-25 DIAGNOSIS — Z98.891 HISTORY OF UTERINE SCAR FROM PREVIOUS SURGERY: Chronic | ICD-10-CM

## 2025-06-25 DIAGNOSIS — I10 ESSENTIAL (PRIMARY) HYPERTENSION: ICD-10-CM

## 2025-06-25 DIAGNOSIS — Z98.890 OTHER SPECIFIED POSTPROCEDURAL STATES: Chronic | ICD-10-CM

## 2025-06-25 LAB
A1C WITH ESTIMATED AVERAGE GLUCOSE RESULT: 6.2 % — HIGH (ref 4–5.6)
ANION GAP SERPL CALC-SCNC: 15 MMOL/L — HIGH (ref 7–14)
BASOPHILS # BLD AUTO: 0.04 K/UL — SIGNIFICANT CHANGE UP (ref 0–0.2)
BASOPHILS NFR BLD AUTO: 0.4 % — SIGNIFICANT CHANGE UP (ref 0–2)
BLD GP AB SCN SERPL QL: NEGATIVE — SIGNIFICANT CHANGE UP
BUN SERPL-MCNC: 12 MG/DL — SIGNIFICANT CHANGE UP (ref 7–23)
CALCIUM SERPL-MCNC: 9.3 MG/DL — SIGNIFICANT CHANGE UP (ref 8.4–10.5)
CHLORIDE SERPL-SCNC: 98 MMOL/L — SIGNIFICANT CHANGE UP (ref 98–107)
CO2 SERPL-SCNC: 26 MMOL/L — SIGNIFICANT CHANGE UP (ref 22–31)
CREAT SERPL-MCNC: 0.63 MG/DL — SIGNIFICANT CHANGE UP (ref 0.5–1.3)
EGFR: 97 ML/MIN/1.73M2 — SIGNIFICANT CHANGE UP
EGFR: 97 ML/MIN/1.73M2 — SIGNIFICANT CHANGE UP
EOSINOPHIL # BLD AUTO: 0.22 K/UL — SIGNIFICANT CHANGE UP (ref 0–0.5)
EOSINOPHIL NFR BLD AUTO: 2.5 % — SIGNIFICANT CHANGE UP (ref 0–6)
ESTIMATED AVERAGE GLUCOSE: 131 — SIGNIFICANT CHANGE UP
GLUCOSE SERPL-MCNC: 85 MG/DL — SIGNIFICANT CHANGE UP (ref 70–99)
HCT VFR BLD CALC: 40.1 % — SIGNIFICANT CHANGE UP (ref 34.5–45)
HGB BLD-MCNC: 13 G/DL — SIGNIFICANT CHANGE UP (ref 11.5–15.5)
IMM GRANULOCYTES NFR BLD AUTO: 0.3 % — SIGNIFICANT CHANGE UP (ref 0–0.9)
LYMPHOCYTES # BLD AUTO: 2.54 K/UL — SIGNIFICANT CHANGE UP (ref 1–3.3)
LYMPHOCYTES # BLD AUTO: 28.4 % — SIGNIFICANT CHANGE UP (ref 13–44)
MCHC RBC-ENTMCNC: 27.1 PG — SIGNIFICANT CHANGE UP (ref 27–34)
MCHC RBC-ENTMCNC: 32.4 G/DL — SIGNIFICANT CHANGE UP (ref 32–36)
MCV RBC AUTO: 83.7 FL — SIGNIFICANT CHANGE UP (ref 80–100)
MONOCYTES # BLD AUTO: 0.7 K/UL — SIGNIFICANT CHANGE UP (ref 0–0.9)
MONOCYTES NFR BLD AUTO: 7.8 % — SIGNIFICANT CHANGE UP (ref 2–14)
NEUTROPHILS # BLD AUTO: 5.4 K/UL — SIGNIFICANT CHANGE UP (ref 1.8–7.4)
NEUTROPHILS NFR BLD AUTO: 60.6 % — SIGNIFICANT CHANGE UP (ref 43–77)
PLATELET # BLD AUTO: 296 K/UL — SIGNIFICANT CHANGE UP (ref 150–400)
POTASSIUM SERPL-MCNC: 3.3 MMOL/L — LOW (ref 3.5–5.3)
POTASSIUM SERPL-SCNC: 3.3 MMOL/L — LOW (ref 3.5–5.3)
RBC # BLD: 4.79 M/UL — SIGNIFICANT CHANGE UP (ref 3.8–5.2)
RBC # FLD: 13.2 % — SIGNIFICANT CHANGE UP (ref 10.3–14.5)
RH IG SCN BLD-IMP: POSITIVE — SIGNIFICANT CHANGE UP
RH IG SCN BLD-IMP: POSITIVE — SIGNIFICANT CHANGE UP
SODIUM SERPL-SCNC: 139 MMOL/L — SIGNIFICANT CHANGE UP (ref 135–145)
WBC # BLD: 8.93 K/UL — SIGNIFICANT CHANGE UP (ref 3.8–10.5)
WBC # FLD AUTO: 8.93 K/UL — SIGNIFICANT CHANGE UP (ref 3.8–10.5)

## 2025-06-25 RX ORDER — LOSARTAN POTASSIUM 100 MG/1
150 TABLET, FILM COATED ORAL
Refills: 0 | DISCHARGE

## 2025-06-25 RX ORDER — HYDROCHLOROTHIAZIDE 50 MG/1
1 TABLET ORAL
Refills: 0 | DISCHARGE

## 2025-06-25 RX ORDER — CYCLOBENZAPRINE HYDROCHLORIDE 15 MG/1
1 CAPSULE, EXTENDED RELEASE ORAL
Refills: 0 | DISCHARGE

## 2025-06-25 RX ORDER — SODIUM CHLORIDE 9 G/1000ML
1000 INJECTION, SOLUTION INTRAVENOUS
Refills: 0 | Status: DISCONTINUED | OUTPATIENT
Start: 2025-07-15 | End: 2025-07-16

## 2025-06-25 RX ORDER — B1/B2/B3/B5/B6/B12/VIT C/FOLIC 500-0.5 MG
1 TABLET ORAL
Refills: 0 | DISCHARGE

## 2025-06-25 NOTE — H&P PST ADULT - NSICDXPASTMEDICALHX_GEN_ALL_CORE_FT
PAST MEDICAL HISTORY:  Calculus of bile duct w/o obstruction, cholangitis, or cholecystitis     GERD (gastroesophageal reflux disease)     H/O Crohn's disease     History of spinal stenosis     Spinal stenosis, lumbosacral region     Spondylolisthesis, lumbar region

## 2025-06-25 NOTE — H&P PST ADULT - AS BP NONINV METHOD
Thank you! Thank you for allowing me to care for you in the emergency department. It is my goal to provide you with excellent care. If you have not received excellent quality care, please ask to speak to the nurse manager. Please fill out the survey that will come to you by mail or email since we listen to your feedback! Below you will find a list of your tests from today's visit. Should you have any questions, please do not hesitate to call the emergency department. Labs  No results found for this or any previous visit (from the past 12 hour(s)). Radiologic Studies  No orders to display     ------------------------------------------------------------------------------------------------------------  The exam and treatment you received in the Emergency Department were for an urgent problem and are not intended as complete care. It is important that you follow-up with a doctor, nurse practitioner, or physician assistant to:  (1) confirm your diagnosis,  (2) re-evaluation of changes in your illness and treatment, and  (3) for ongoing care. Please take your discharge instructions with you when you go to your follow-up appointment. If you have any problem arranging a follow-up appointment, contact the Emergency Department. If your symptoms become worse or you do not improve as expected and you are unable to reach your health care provider, please return to the Emergency Department. We are available 24 hours a day. If a prescription has been provided, please have it filled as soon as possible to prevent a delay in treatment. If you have any questions or reservations about taking the medication due to side effects or interactions with other medications, please call your primary care provider or contact the ER.
electronic

## 2025-06-25 NOTE — H&P PST ADULT - HISTORY OF PRESENT ILLNESS
69 year old female with pmhx of Crohn's disease, HTN, GERD presents for pre-op evaluation for diagnosis of Spinal stenosis Lumbosacral region and Spondylolisthesis Lumbar region. Pt c/o progressively worsening low back pain, radiating down from b/l hips to her lower legs. Patient has tried steroid injections x 4, PT, Tylenol (pt cant take NSAIDS due to Crohns) without any relief. Denies numbness and tingling or swelling of extremities, fevers, cp, sob, mcbride, palpitations, n/v/d/c.

## 2025-06-25 NOTE — H&P PST ADULT - PROBLEM SELECTOR PLAN 1
Patient tentatively scheduled for L4-5 Lumbar laminectomy with fusion on 7/15/25.  Pre-op instructions provided. Pt given verbal and written instructions with teach back on chlorhexidine shampoo. Pt verbalized understanding with return demonstration.     CBC/Anemia, BMP, HgbA1c, Type and screen/Abo, MRSA swab were done at PST.

## 2025-06-25 NOTE — H&P PST ADULT - NSICDXFAMILYHX_GEN_ALL_CORE_FT
FAMILY HISTORY:  Father  Still living? Yes, Estimated age: Age Unknown  Family history of diabetes mellitus, Age at diagnosis: Age Unknown

## 2025-06-25 NOTE — H&P PST ADULT - MUSCULOSKELETAL
details… normal/ROM intact/no joint swelling/no joint erythema/no joint warmth/no calf tenderness/strength 5/5 bilateral upper extremities/strength 5/5 bilateral lower extremities/back exam/abnormal gait

## 2025-06-25 NOTE — H&P PST ADULT - PROBLEM SELECTOR PLAN 2
Patient instructed to take Hydrochlorothiazide and Losartan with a sip of water on the morning of procedure.

## 2025-06-25 NOTE — H&P PST ADULT - PATIENT ON (OXYGEN DELIVERY METHOD)
Caller would like to discuss an/a Appointment for diabetic machine. Patient talked with an on call dr yesterday. Patient would like to be seen with in a day or two. Due to complications with diabetes.  Patient spoke with an on call doctor yesterday. (see encounter 04/12/2020)  Writer advised caller of callback today.    Patient Name: Bryce DIAZ Mayabryce  Caller Name: Bryce  Name of Facility:   Callback Number: [phone]  Best Availability: this morning  Can A Detailed Message Be left? yes  Fax Number:   Additional Info:   Did you confirm the message with the caller?: yes    Thank you,  Heide Garcia   room air

## 2025-06-26 LAB
MRSA PCR RESULT.: SIGNIFICANT CHANGE UP
S AUREUS DNA NOSE QL NAA+PROBE: SIGNIFICANT CHANGE UP

## 2025-07-05 ENCOUNTER — NON-APPOINTMENT (OUTPATIENT)
Age: 70
End: 2025-07-05

## 2025-07-14 NOTE — ASU PATIENT PROFILE, ADULT - HOW PATIENT ADDRESSED, PROFILE
Daughter called back for status. Unable to locate letter. Spoke with MA. She will have the Md sign a new letter and fax it. Please call daughter when done. Delmy

## 2025-07-14 NOTE — ASU PATIENT PROFILE, ADULT - NSICDXPASTSURGICALHX_GEN_ALL_CORE_FT
PAST SURGICAL HISTORY:  H/O colonoscopy     H/O endoscopy     H/O:      History of cholecystectomy

## 2025-07-15 ENCOUNTER — INPATIENT (INPATIENT)
Facility: HOSPITAL | Age: 70
LOS: 3 days | Discharge: HOME | End: 2025-07-19
Attending: ORTHOPAEDIC SURGERY | Admitting: ORTHOPAEDIC SURGERY
Payer: MEDICARE

## 2025-07-15 ENCOUNTER — APPOINTMENT (OUTPATIENT)
Dept: ORTHOPEDIC SURGERY | Facility: HOSPITAL | Age: 70
End: 2025-07-15

## 2025-07-15 ENCOUNTER — RESULT REVIEW (OUTPATIENT)
Age: 70
End: 2025-07-15

## 2025-07-15 ENCOUNTER — TRANSCRIPTION ENCOUNTER (OUTPATIENT)
Age: 70
End: 2025-07-15

## 2025-07-15 VITALS
RESPIRATION RATE: 16 BRPM | HEIGHT: 60 IN | TEMPERATURE: 98 F | DIASTOLIC BLOOD PRESSURE: 65 MMHG | WEIGHT: 175.05 LBS | OXYGEN SATURATION: 98 % | SYSTOLIC BLOOD PRESSURE: 141 MMHG | HEART RATE: 88 BPM

## 2025-07-15 DIAGNOSIS — Z90.49 ACQUIRED ABSENCE OF OTHER SPECIFIED PARTS OF DIGESTIVE TRACT: Chronic | ICD-10-CM

## 2025-07-15 DIAGNOSIS — Z98.890 OTHER SPECIFIED POSTPROCEDURAL STATES: Chronic | ICD-10-CM

## 2025-07-15 DIAGNOSIS — Z98.891 HISTORY OF UTERINE SCAR FROM PREVIOUS SURGERY: Chronic | ICD-10-CM

## 2025-07-15 LAB
ANION GAP SERPL CALC-SCNC: 15 MMOL/L — HIGH (ref 7–14)
BASOPHILS # BLD AUTO: 0.03 K/UL — SIGNIFICANT CHANGE UP (ref 0–0.2)
BASOPHILS NFR BLD AUTO: 0.3 % — SIGNIFICANT CHANGE UP (ref 0–2)
BUN SERPL-MCNC: 9 MG/DL — SIGNIFICANT CHANGE UP (ref 7–23)
CALCIUM SERPL-MCNC: 8.3 MG/DL — LOW (ref 8.4–10.5)
CHLORIDE SERPL-SCNC: 100 MMOL/L — SIGNIFICANT CHANGE UP (ref 98–107)
CO2 SERPL-SCNC: 22 MMOL/L — SIGNIFICANT CHANGE UP (ref 22–31)
CREAT SERPL-MCNC: 0.65 MG/DL — SIGNIFICANT CHANGE UP (ref 0.5–1.3)
EGFR: 96 ML/MIN/1.73M2 — SIGNIFICANT CHANGE UP
EGFR: 96 ML/MIN/1.73M2 — SIGNIFICANT CHANGE UP
EOSINOPHIL # BLD AUTO: 0.05 K/UL — SIGNIFICANT CHANGE UP (ref 0–0.5)
EOSINOPHIL NFR BLD AUTO: 0.6 % — SIGNIFICANT CHANGE UP (ref 0–6)
GLUCOSE BLDC GLUCOMTR-MCNC: 103 MG/DL — HIGH (ref 70–99)
GLUCOSE SERPL-MCNC: 125 MG/DL — HIGH (ref 70–99)
HCT VFR BLD CALC: 36.8 % — SIGNIFICANT CHANGE UP (ref 34.5–45)
HGB BLD-MCNC: 12 G/DL — SIGNIFICANT CHANGE UP (ref 11.5–15.5)
IMM GRANULOCYTES # BLD AUTO: 0.06 K/UL — SIGNIFICANT CHANGE UP (ref 0–0.07)
IMM GRANULOCYTES NFR BLD AUTO: 0.7 % — SIGNIFICANT CHANGE UP (ref 0–0.9)
LYMPHOCYTES # BLD AUTO: 1.89 K/UL — SIGNIFICANT CHANGE UP (ref 1–3.3)
LYMPHOCYTES NFR BLD AUTO: 20.9 % — SIGNIFICANT CHANGE UP (ref 13–44)
MCHC RBC-ENTMCNC: 27.1 PG — SIGNIFICANT CHANGE UP (ref 27–34)
MCHC RBC-ENTMCNC: 32.6 G/DL — SIGNIFICANT CHANGE UP (ref 32–36)
MCV RBC AUTO: 83.3 FL — SIGNIFICANT CHANGE UP (ref 80–100)
MONOCYTES # BLD AUTO: 0.27 K/UL — SIGNIFICANT CHANGE UP (ref 0–0.9)
MONOCYTES NFR BLD AUTO: 3 % — SIGNIFICANT CHANGE UP (ref 2–14)
NEUTROPHILS # BLD AUTO: 6.75 K/UL — SIGNIFICANT CHANGE UP (ref 1.8–7.4)
NEUTROPHILS NFR BLD AUTO: 74.5 % — SIGNIFICANT CHANGE UP (ref 43–77)
NRBC # BLD AUTO: 0 K/UL — SIGNIFICANT CHANGE UP (ref 0–0)
NRBC # FLD: 0 K/UL — SIGNIFICANT CHANGE UP (ref 0–0)
NRBC BLD AUTO-RTO: 0 /100 WBCS — SIGNIFICANT CHANGE UP (ref 0–0)
PLATELET # BLD AUTO: 277 K/UL — SIGNIFICANT CHANGE UP (ref 150–400)
PMV BLD: 9.1 FL — SIGNIFICANT CHANGE UP (ref 7–13)
POTASSIUM SERPL-MCNC: 3.9 MMOL/L — SIGNIFICANT CHANGE UP (ref 3.5–5.3)
POTASSIUM SERPL-SCNC: 3.9 MMOL/L — SIGNIFICANT CHANGE UP (ref 3.5–5.3)
RBC # BLD: 4.42 M/UL — SIGNIFICANT CHANGE UP (ref 3.8–5.2)
RBC # FLD: 13.2 % — SIGNIFICANT CHANGE UP (ref 10.3–14.5)
SODIUM SERPL-SCNC: 137 MMOL/L — SIGNIFICANT CHANGE UP (ref 135–145)
WBC # BLD: 9.05 K/UL — SIGNIFICANT CHANGE UP (ref 3.8–10.5)
WBC # FLD AUTO: 9.05 K/UL — SIGNIFICANT CHANGE UP (ref 3.8–10.5)

## 2025-07-15 PROCEDURE — 72100 X-RAY EXAM L-S SPINE 2/3 VWS: CPT | Mod: 26

## 2025-07-15 PROCEDURE — 63011 REMOVE SPINE LAMINA 1/2 SCRL: CPT | Mod: 82

## 2025-07-15 PROCEDURE — 63047 LAM FACETEC & FORAMOT LUMBAR: CPT | Mod: 82,59

## 2025-07-15 PROCEDURE — 63267 EXCISE INTRSPINL LESION LMBR: CPT | Mod: 82

## 2025-07-15 PROCEDURE — 22840 INSERT SPINE FIXATION DEVICE: CPT | Mod: 82

## 2025-07-15 PROCEDURE — 22840 INSERT SPINE FIXATION DEVICE: CPT

## 2025-07-15 PROCEDURE — 22612 ARTHRD PST TQ 1NTRSPC LUMBAR: CPT

## 2025-07-15 PROCEDURE — 22612 ARTHRD PST TQ 1NTRSPC LUMBAR: CPT | Mod: 82

## 2025-07-15 PROCEDURE — 63267 EXCISE INTRSPINL LESION LMBR: CPT

## 2025-07-15 PROCEDURE — 88311 DECALCIFY TISSUE: CPT | Mod: 26

## 2025-07-15 PROCEDURE — 63011 REMOVE SPINE LAMINA 1/2 SCRL: CPT

## 2025-07-15 PROCEDURE — 88304 TISSUE EXAM BY PATHOLOGIST: CPT | Mod: 26

## 2025-07-15 PROCEDURE — 63047 LAM FACETEC & FORAMOT LUMBAR: CPT | Mod: 59

## 2025-07-15 DEVICE — ROD RAD 45: Type: IMPLANTABLE DEVICE | Status: FUNCTIONAL

## 2025-07-15 DEVICE — SCREW SERRATO 5.5X45MM: Type: IMPLANTABLE DEVICE | Status: FUNCTIONAL

## 2025-07-15 DEVICE — SCREW BLOCKER BONE XIA: Type: IMPLANTABLE DEVICE | Status: FUNCTIONAL

## 2025-07-15 DEVICE — SURGIFOAM 2 X 6CM X 7MM (12-7): Type: IMPLANTABLE DEVICE | Status: FUNCTIONAL

## 2025-07-15 DEVICE — SCREW XIA 3 5.5X40MM: Type: IMPLANTABLE DEVICE | Status: FUNCTIONAL

## 2025-07-15 DEVICE — SURGIFLO MATRIX WITH THROMBIN KIT: Type: IMPLANTABLE DEVICE | Status: FUNCTIONAL

## 2025-07-15 DEVICE — BONE WAX 2.5GM: Type: IMPLANTABLE DEVICE | Status: FUNCTIONAL

## 2025-07-15 RX ORDER — TRAMADOL HYDROCHLORIDE 50 MG/1
50 TABLET, FILM COATED ORAL EVERY 8 HOURS
Refills: 0 | Status: DISCONTINUED | OUTPATIENT
Start: 2025-07-15 | End: 2025-07-19

## 2025-07-15 RX ORDER — HYDROCHLOROTHIAZIDE 50 MG/1
1 TABLET ORAL
Refills: 0 | DISCHARGE

## 2025-07-15 RX ORDER — SENNA 187 MG
2 TABLET ORAL AT BEDTIME
Refills: 0 | Status: DISCONTINUED | OUTPATIENT
Start: 2025-07-15 | End: 2025-07-19

## 2025-07-15 RX ORDER — POLYETHYLENE GLYCOL 3350 17 G/17G
17 POWDER, FOR SOLUTION ORAL AT BEDTIME
Refills: 0 | Status: DISCONTINUED | OUTPATIENT
Start: 2025-07-15 | End: 2025-07-19

## 2025-07-15 RX ORDER — CYCLOBENZAPRINE HYDROCHLORIDE 15 MG/1
5 CAPSULE, EXTENDED RELEASE ORAL THREE TIMES A DAY
Refills: 0 | Status: DISCONTINUED | OUTPATIENT
Start: 2025-07-15 | End: 2025-07-17

## 2025-07-15 RX ORDER — LOSARTAN POTASSIUM 100 MG/1
150 TABLET, FILM COATED ORAL
Refills: 0 | DISCHARGE

## 2025-07-15 RX ORDER — TRAMADOL HYDROCHLORIDE 50 MG/1
50 TABLET, FILM COATED ORAL ONCE
Refills: 0 | Status: DISCONTINUED | OUTPATIENT
Start: 2025-07-15 | End: 2025-07-15

## 2025-07-15 RX ORDER — CEFAZOLIN SODIUM IN 0.9 % NACL 3 G/100 ML
2000 INTRAVENOUS SOLUTION, PIGGYBACK (ML) INTRAVENOUS EVERY 8 HOURS
Refills: 0 | Status: COMPLETED | OUTPATIENT
Start: 2025-07-15 | End: 2025-07-16

## 2025-07-15 RX ORDER — ACETAMINOPHEN 500 MG/5ML
975 LIQUID (ML) ORAL EVERY 8 HOURS
Refills: 0 | Status: DISCONTINUED | OUTPATIENT
Start: 2025-07-15 | End: 2025-07-19

## 2025-07-15 RX ORDER — B1/B2/B3/B5/B6/B12/VIT C/FOLIC 500-0.5 MG
1 TABLET ORAL
Refills: 0 | DISCHARGE

## 2025-07-15 RX ORDER — ONDANSETRON HCL/PF 4 MG/2 ML
4 VIAL (ML) INJECTION EVERY 6 HOURS
Refills: 0 | Status: DISCONTINUED | OUTPATIENT
Start: 2025-07-15 | End: 2025-07-19

## 2025-07-15 RX ORDER — FENTANYL CITRATE-0.9 % NACL/PF 100MCG/2ML
25 SYRINGE (ML) INTRAVENOUS
Refills: 0 | Status: DISCONTINUED | OUTPATIENT
Start: 2025-07-15 | End: 2025-07-15

## 2025-07-15 RX ORDER — B1/B2/B3/B5/B6/B12/VIT C/FOLIC 500-0.5 MG
1 TABLET ORAL DAILY
Refills: 0 | Status: DISCONTINUED | OUTPATIENT
Start: 2025-07-15 | End: 2025-07-19

## 2025-07-15 RX ORDER — HYDROMORPHONE/SOD CHLOR,ISO/PF 2 MG/10 ML
0.25 SYRINGE (ML) INJECTION
Refills: 0 | Status: DISCONTINUED | OUTPATIENT
Start: 2025-07-15 | End: 2025-07-15

## 2025-07-15 RX ORDER — ACETAMINOPHEN 500 MG/5ML
975 LIQUID (ML) ORAL ONCE
Refills: 0 | Status: COMPLETED | OUTPATIENT
Start: 2025-07-15 | End: 2025-07-15

## 2025-07-15 RX ORDER — PREGABALIN 50 MG/1
150 CAPSULE ORAL ONCE
Refills: 0 | Status: DISCONTINUED | OUTPATIENT
Start: 2025-07-15 | End: 2025-07-15

## 2025-07-15 RX ORDER — ONDANSETRON HCL/PF 4 MG/2 ML
4 VIAL (ML) INJECTION ONCE
Refills: 0 | Status: COMPLETED | OUTPATIENT
Start: 2025-07-15 | End: 2025-07-15

## 2025-07-15 RX ORDER — GABAPENTIN 400 MG/1
100 CAPSULE ORAL THREE TIMES A DAY
Refills: 0 | Status: DISCONTINUED | OUTPATIENT
Start: 2025-07-15 | End: 2025-07-19

## 2025-07-15 RX ORDER — LOSARTAN POTASSIUM 100 MG/1
150 TABLET, FILM COATED ORAL DAILY
Refills: 0 | Status: DISCONTINUED | OUTPATIENT
Start: 2025-07-15 | End: 2025-07-19

## 2025-07-15 RX ORDER — OXYCODONE HYDROCHLORIDE 30 MG/1
5 TABLET ORAL EVERY 6 HOURS
Refills: 0 | Status: DISCONTINUED | OUTPATIENT
Start: 2025-07-15 | End: 2025-07-15

## 2025-07-15 RX ORDER — OXYCODONE HYDROCHLORIDE 30 MG/1
10 TABLET ORAL EVERY 4 HOURS
Refills: 0 | Status: DISCONTINUED | OUTPATIENT
Start: 2025-07-15 | End: 2025-07-19

## 2025-07-15 RX ORDER — OXYCODONE HYDROCHLORIDE 30 MG/1
5 TABLET ORAL EVERY 4 HOURS
Refills: 0 | Status: DISCONTINUED | OUTPATIENT
Start: 2025-07-15 | End: 2025-07-19

## 2025-07-15 RX ADMIN — TRAMADOL HYDROCHLORIDE 50 MILLIGRAM(S): 50 TABLET, FILM COATED ORAL at 07:00

## 2025-07-15 RX ADMIN — GABAPENTIN 100 MILLIGRAM(S): 400 CAPSULE ORAL at 21:21

## 2025-07-15 RX ADMIN — Medication 975 MILLIGRAM(S): at 07:00

## 2025-07-15 RX ADMIN — Medication 0.25 MILLIGRAM(S): at 12:30

## 2025-07-15 RX ADMIN — Medication 975 MILLIGRAM(S): at 21:23

## 2025-07-15 RX ADMIN — Medication 100 MILLIGRAM(S): at 16:42

## 2025-07-15 RX ADMIN — Medication 1 APPLICATION(S): at 06:36

## 2025-07-15 RX ADMIN — Medication 40 MILLIGRAM(S): at 07:01

## 2025-07-15 RX ADMIN — Medication 2 TABLET(S): at 21:23

## 2025-07-15 RX ADMIN — PREGABALIN 150 MILLIGRAM(S): 50 CAPSULE ORAL at 07:01

## 2025-07-15 RX ADMIN — Medication 1000 MILLIGRAM(S): at 21:21

## 2025-07-15 RX ADMIN — POLYETHYLENE GLYCOL 3350 17 GRAM(S): 17 POWDER, FOR SOLUTION ORAL at 21:24

## 2025-07-15 RX ADMIN — Medication 4 MILLIGRAM(S): at 13:10

## 2025-07-15 RX ADMIN — Medication 4 MILLIGRAM(S): at 16:52

## 2025-07-15 RX ADMIN — Medication 975 MILLIGRAM(S): at 15:24

## 2025-07-15 RX ADMIN — Medication 0.25 MILLIGRAM(S): at 13:00

## 2025-07-15 RX ADMIN — GABAPENTIN 100 MILLIGRAM(S): 400 CAPSULE ORAL at 14:37

## 2025-07-15 RX ADMIN — Medication 1 TABLET(S): at 14:36

## 2025-07-15 RX ADMIN — SODIUM CHLORIDE 30 MILLILITER(S): 9 INJECTION, SOLUTION INTRAVENOUS at 07:00

## 2025-07-15 RX ADMIN — Medication 1000 MILLILITER(S): at 12:02

## 2025-07-15 RX ADMIN — Medication 975 MILLIGRAM(S): at 22:23

## 2025-07-15 RX ADMIN — Medication 975 MILLIGRAM(S): at 14:36

## 2025-07-15 NOTE — PHYSICAL THERAPY INITIAL EVALUATION ADULT - PERTINENT HX OF CURRENT PROBLEM, REHAB EVAL
Pt is a 69 year old female, status post Laminotomy, spine, lumbar, 2 levels, posterior approach, for decompression, POD #0

## 2025-07-15 NOTE — PHYSICAL THERAPY INITIAL EVALUATION ADULT - NSPTDMEREC_GEN_A_CORE
rolling walker For MRADLs in the home and in the community for safe ambulation and transfers/rolling walker

## 2025-07-15 NOTE — BRIEF OPERATIVE NOTE - NSICDXBRIEFPROCEDURE_GEN_ALL_CORE_FT
PROCEDURES:  Laminotomy, spine, lumbar, 2 levels, posterior approach, for decompression 15-Jul-2025 12:06:08 With PSF Teresa Trejo

## 2025-07-15 NOTE — DISCHARGE NOTE PROVIDER - NSDCCPTREATMENT_GEN_ALL_CORE_FT
PRINCIPAL PROCEDURE  Procedure: Laminotomy, spine, lumbar, 2 levels, posterior approach, for decompression  Findings and Treatment: Pain control:    Standing:         -Acetaminophen 500mg - 2 tabs every 8 hours  As needed:        -Flexeril 5mg - 1 tab three times a day - Take as needed for musle spasms        -Gabapentin 100mg - 1 tab every 8 hours        -Tramadol 50mg - 1 tab every 6-8 hours - Take only if needed for MODERATE pain       -Oxycodone 5mg - 1 tab every 4-6 hours - Take only if needed for SEVERE or BREAKTHROUGH pain  Oxycodone and Tramadol have been sent to your pharmacy. Please do not drive, operate machinery, or make important decisions while taking these medications.   Other Medications: (Standing)  -Protonix 40mg - 1 tab every 24 hours - to prevent stomach irritation/ulcers  -Senna 8.6mg - 2 pills every 24 hours - stool softener  -Miralax 17g - daily - constipation   Follow up: Please follow up at your prescheduled post-operative follow up appointment with Dr. Marquez for 2 weeks after hospital discharge. Please call with any questions or concerns including fevers/chills, worsening pain, pus from the wounds, redness of the skin, new or worsening trouble when you swallow, worsening hoarsness or trouble speaking, difficulty breathing or heaviness in the chest at [phone].   Please seek immediate care or call 911 if:   -Your bandage begins to soak with blood  -Your surgical wound breaks open  -You have severe back or leg pain  -You cannot control your bladder or bowel movements  -You have a high fever with nausea and vomiting  -You have painful swelling in your neck AND trouble swallowing  -You have new or worsening trouble moving your neck, arms, or legs

## 2025-07-15 NOTE — DISCHARGE NOTE PROVIDER - NSDCFUSCHEDAPPT_GEN_ALL_CORE_FT
Mohit Marquez  [facility]  ORTHOSURG [address]  Scheduled Appointment: 07/28/2025

## 2025-07-15 NOTE — DISCHARGE NOTE PROVIDER - CARE PROVIDER_API CALL
Mohit Marquez  Orthopaedic Surgery  [address], [address]  [place] [address]  Phone: [phone]  Fax: [phone]  Established Patient  Follow Up Time:

## 2025-07-15 NOTE — DISCHARGE NOTE PROVIDER - NSDCCPCAREPLAN_GEN_ALL_CORE_FT
PRINCIPAL DISCHARGE DIAGNOSIS  Diagnosis: Lumbar spinal stenosis  Assessment and Plan of Treatment: IMPORTANT: *DO NOT resume any anticoagulation/blood thinners (Aspirin, Plavix, Eliquis, Coumadin, Xarelto, ect.) until instructed by your surgeon.*  *DO NOT take any NSAIDs (Motrin, Aleve, Advil, Ibuprofen, Celebrex, ect.) until instructed by  your surgeon.*  Diet: Continue regular diet upon discharge.   Activity: No heavy lifting. Avoid straining or excessive activity. DO NOT sit for long periods of time.   -DO NOT bend, twist, or lift heavy objects for at least 3 months.   -DO NOT drive until cleared by your surgeon.   -To reduce strain/pressure on your spine place a pillow under your knees when lying on your back. Place a pillow between your knees when lying on your side.   -When you sit put your feet up on a foot rest. DO NOT lie on your stomach or with your legs flat.  -If you need to bend over, bend at your knees, not your back.  -We encourage you to take many short walks after your surgery to help blood move through your body and to prevent blood clots from forming. If you feel weak or dizzy, sit or lie down right away.   Dressings: Keep dressing clean, dry, and intact. Remove all cotton and yellow gauze 72 hours after surgery. You do not need to put a new dressing on your wound unless there is light drainage. If that occurs place Band-Aids on your wound.      Other Care:  -You may shower 72 hours after surgery but DO NOT soak dressing and/or incision. The water may run over your dressing/incision but DO NOT let the water directly hit your dressing/incision (take a shower with your wound away from the direct stream of water).  NO hot tubes, NO bath tubs, NO swimming pools.

## 2025-07-15 NOTE — ASU PREOP CHECKLIST - AICD PRESENT
"PULMONARY CRITICAL CARE Progress  NOTE      PATIENT IDENTIFICATION:  Name: Axel Ramirez  MRN: [id number]  :  1948     Age: 72 y.o.  Sex: male    DATE OF Note:  3/1/2021   Referring Physician: Shwetha Olivares MD                  Subjective:   On 3 L O2, no SOB, no chest pain, no nausea or vomiting, no change in bowel habit, no dysuria,  no new  skin rash or itching.      Objective:  tMax 24 hrs: Temp (24hrs), Av.6 °F (36.4 °C), Min:97.2 °F (36.2 °C), Max:98.5 °F (36.9 °C)      Vitals Ranges:   Temp:  [97.2 °F (36.2 °C)-98.5 °F (36.9 °C)] 97.2 °F (36.2 °C)  Heart Rate:  [] 74  Resp:  [15-18] 16  BP: (108-119)/(53-84) 111/63    Intake and Output Last 3 Shifts:   I/O last 3 completed shifts:  In: 3117 [P.O.:120; Other:1107; NG/GT:1890]  Out: 1400 [Urine:1400]    Exam:  /63   Pulse 74   Temp 97.2 °F (36.2 °C) (Oral)   Resp 16   Ht 177.8 cm (70\")   Wt 65 kg (143 lb 4.8 oz) Comment: with blankets on bed. RN aware  SpO2 98%   BMI 20.56 kg/m²     General Appearance: Alert      HEENT:  Normocephalic, without obvious abnormality, Conjunctiva/corneas clear,.  Normal external ear canals, Nares normal, no drainage     Neck:  Supple, symmetrical, trachea midline. No JVD.  Lungs /Chest wall:   Bilateral basal rhonchi, respirations unlabored symmetrical wall movement.     Heart:  Regular rate and rhythm, systolic murmur PMI left sternal border  Abdomen: Soft, non-tender, no masses, no organomegaly.    Extremities: Trace edema no clubbing or Cyanosis  SKIN: Left buttock -Unstageable pressure injury   Coccyx/theresa cleft with masd linear ulceration        Medications:    Current Facility-Administered Medications:   •  acetaminophen (TYLENOL) suppository 650 mg, 650 mg, Rectal, Q6H PRN, Day, Carina, APRN, 650 mg at 21 1605  •  acetaminophen (TYLENOL) tablet 650 mg, 650 mg, Oral, Q6H PRN, Day, Carina, APRN, 650 mg at 21 1011  •  aspirin chewable tablet 81 mg, 81 mg, Oral, Daily, Efrem Langley" Christiane Evans MD, 81 mg at 03/01/21 0859  •  atorvastatin (LIPITOR) tablet 40 mg, 40 mg, Oral, Nightly, Efrem Langley MD, 40 mg at 02/28/21 2017  •  bisacodyl (DULCOLAX) suppository 10 mg, 10 mg, Rectal, Daily PRN, Efrem Langley MD, 10 mg at 02/26/21 1405  •  budesonide (PULMICORT) nebulizer solution 0.5 mg, 0.5 mg, Nebulization, BID - RT, Lianna Groves MD, 0.5 mg at 03/01/21 0714  •  buprenorphine-naloxone (SUBOXONE) 8-2 MG per SL tablet 1 tablet, 1 tablet, Sublingual, Daily, Efrem Langley MD, 1 tablet at 03/01/21 0859  •  calcium gluconate 1g/50ml 0.675% NaCl IV SOLN, 1 g, Intravenous, PRN **AND** calcium gluconate 2-0.675 GM/100ML NACL IVPB, 2 g, Intravenous, PRN **AND** Calcium, Ionized, , , PRN, Efrem Langley MD  •  docusate (COLACE) 50 MG/5ML liquid 100 mg, 100 mg, Oral, BID, Efrem Langley MD, 100 mg at 03/01/21 0859  •  epoetin asia-epbx (RETACRIT) injection 40,000 Units, 40,000 Units, Subcutaneous, Q7 Days, Axel Lewis MD, 40,000 Units at 02/28/21 1142  •  furosemide (LASIX) injection 20 mg, 20 mg, Intravenous, Q12H, Efrem Langley MD, 20 mg at 03/01/21 1002  •  gabapentin (NEURONTIN) capsule 300 mg, 300 mg, Oral, TID, Efrem Langley MD, 300 mg at 03/01/21 0859  •  guaiFENesin solution 300 mg, 300 mg, Oral, Q6H, Efrem Langley MD, 300 mg at 03/01/21 0902  •  haloperidol lactate (HALDOL) injection 2 mg, 2 mg, Intramuscular, Q6H PRN, Day, Carina, APRN, 2 mg at 02/24/21 0554  •  ibuprofen (ADVIL,MOTRIN) 100 MG/5ML suspension 400 mg, 400 mg, Oral, Q6H PRN, Raysa Alatorre, APRN, 400 mg at 02/24/21 1833  •  ipratropium-albuterol (DUO-NEB) nebulizer solution 3 mL, 3 mL, Nebulization, Q4H - RT, Lianna Groves MD, 3 mL at 03/01/21 1108  •  labetalol (NORMODYNE,TRANDATE) injection 20 mg, 20 mg, Intravenous, Q6H PRN, Day, Carina, APRN, 20 mg at  02/23/21 0358  •  lurasidone (LATUDA) tablet 40 mg, 40 mg, Oral, Daily, Efrem Langley MD, 40 mg at 03/01/21 0859  •  Magnesium Sulfate 2 gram Bolus, followed by 8 gram infusion (total Mg dose 10 grams)- Mg less than or equal to 1mg/dL, 2 g, Intravenous, PRN **OR** Magnesium Sulfate 2 gram / 50mL Infusion (GIVE X 3 BAGS TO EQUAL 6GM TOTAL DOSE) - Mg 1.1 - 1.5 mg/dl, 2 g, Intravenous, PRN **OR** Magnesium Sulfate 4 gram infusion- Mg 1.6-1.9 mg/dL, 4 g, Intravenous, PRN, Efrem Langley MD, Last Rate: 25 mL/hr at 02/25/21 1831, 4 g at 02/25/21 1831  •  melatonin tablet 5 mg, 5 mg, Oral, Nightly PRN, Lisandra Cruz, APRN, 5 mg at 02/26/21 2206  •  metoprolol tartrate (LOPRESSOR) injection 5 mg, 5 mg, Intravenous, Q6H PRN, Juanito Palomo APRN, 5 mg at 02/24/21 1537  •  metoprolol tartrate (LOPRESSOR) tablet 50 mg, 50 mg, Oral, Q12H, Efrem Langley MD, 50 mg at 03/01/21 0859  •  ondansetron (ZOFRAN) injection 4 mg, 4 mg, Intravenous, Q6H PRN, So Damon APRN, 4 mg at 02/23/21 0643  •  pantoprazole (PROTONIX) injection 40 mg, 40 mg, Intravenous, Q AM, Ángela Bean MD, 40 mg at 03/01/21 0604  •  potassium chloride (K-DUR,KLOR-CON) CR tablet 40 mEq, 40 mEq, Oral, PRN **OR** potassium chloride (KLOR-CON) packet 40 mEq, 40 mEq, Oral, PRN, 40 mEq at 02/26/21 1513 **OR** potassium chloride 10 mEq in 100 mL IVPB, 10 mEq, Intravenous, Q1H PRN, Love, Juanito E, APRN, Last Rate: 100 mL/hr at 02/22/21 1526, 10 mEq at 02/22/21 1526  •  potassium phosphate 45 mmol in sodium chloride 0.9 % 500 mL infusion, 45 mmol, Intravenous, PRN **OR** potassium phosphate 30 mmol in sodium chloride 0.9 % 250 mL infusion, 30 mmol, Intravenous, PRN **OR** potassium phosphate 15 mmol in sodium chloride 0.9 % 100 mL infusion, 15 mmol, Intravenous, PRN, 15 mmol at 02/26/21 0957 **OR** sodium phosphates 45 mmol in sodium chloride 0.9 % 500 mL IVPB, 45 mmol, Intravenous, PRN **OR**  sodium phosphates 30 mmol in sodium chloride 0.9 % 250 mL IVPB, 30 mmol, Intravenous, PRN **OR** sodium phosphates 15 mmol in sodium chloride 0.9 % 250 mL IVPB, 15 mmol, Intravenous, PRN, Efrem Langley MD  •  predniSONE (DELTASONE) tablet 10 mg, 10 mg, Oral, Daily With Breakfast, Draw, MD Ángela, 10 mg at 03/01/21 0859  •  sertraline (ZOLOFT) tablet 50 mg, 50 mg, Oral, Daily, Efrem Langley MD, 50 mg at 03/01/21 0859  •  sodium chloride 0.9 % flush 10 mL, 10 mL, Intravenous, PRN, Lianna Groves MD, 10 mL at 03/01/21 0859  •  spironolactone (ALDACTONE) tablet 25 mg, 25 mg, Oral, Daily, Efrem Langley MD, 25 mg at 03/01/21 0859  •  Zinc Oxide 16 % ointment, , Apply externally, BID, fErem Langley MD  •  Zinc Oxide 16 % ointment, , Apply externally, PRN, Efrem Langley MD    Data Review:  All labs (24hrs):   Recent Results (from the past 24 hour(s))   POC Glucose Once    Collection Time: 02/28/21  4:06 PM    Specimen: Blood   Result Value Ref Range    Glucose 136 (H) 70 - 105 mg/dL   POC Glucose Once    Collection Time: 02/28/21 11:27 PM    Specimen: Blood   Result Value Ref Range    Glucose 104 70 - 105 mg/dL   Comprehensive Metabolic Panel    Collection Time: 03/01/21  6:17 AM    Specimen: Blood   Result Value Ref Range    Glucose 111 (H) 65 - 99 mg/dL    BUN 38 (H) 8 - 23 mg/dL    Creatinine 0.64 (L) 0.76 - 1.27 mg/dL    Sodium 142 136 - 145 mmol/L    Potassium 3.8 3.5 - 5.2 mmol/L    Chloride 104 98 - 107 mmol/L    CO2 31.0 (H) 22.0 - 29.0 mmol/L    Calcium 8.6 8.6 - 10.5 mg/dL    Total Protein 6.3 6.0 - 8.5 g/dL    Albumin 2.70 (L) 3.50 - 5.20 g/dL    ALT (SGPT) 20 1 - 41 U/L    AST (SGOT) 23 1 - 40 U/L    Alkaline Phosphatase 160 (H) 39 - 117 U/L    Total Bilirubin 0.4 0.0 - 1.2 mg/dL    eGFR Non African Amer 124 >60 mL/min/1.73    Globulin 3.6 gm/dL    A/G Ratio 0.8 g/dL    BUN/Creatinine Ratio 59.4 (H) 7.0 - 25.0     Anion Gap 7.0 5.0 - 15.0 mmol/L   CBC Auto Differential    Collection Time: 03/01/21  6:17 AM    Specimen: Blood   Result Value Ref Range    WBC 9.30 3.40 - 10.80 10*3/mm3    RBC 2.63 (L) 4.14 - 5.80 10*6/mm3    Hemoglobin 9.2 (L) 13.0 - 17.7 g/dL    Hematocrit 28.0 (L) 37.5 - 51.0 %    .7 (H) 79.0 - 97.0 fL    MCH 34.8 (H) 26.6 - 33.0 pg    MCHC 32.7 31.5 - 35.7 g/dL    RDW 24.8 (H) 12.3 - 15.4 %    RDW-SD 89.7 (H) 37.0 - 54.0 fl    MPV 9.3 6.0 - 12.0 fL    Platelets 148 140 - 450 10*3/mm3   Scan Slide    Collection Time: 03/01/21  6:17 AM    Specimen: Blood   Result Value Ref Range    Scan Slide     Manual Differential    Collection Time: 03/01/21  6:17 AM    Specimen: Blood   Result Value Ref Range    Neutrophil % 72.0 42.7 - 76.0 %    Lymphocyte % 11.0 (L) 19.6 - 45.3 %    Monocyte % 8.0 5.0 - 12.0 %    Bands %  6.0 (H) 0.0 - 5.0 %    Metamyelocyte % 1.0 (H) 0.0 - 0.0 %    Myelocyte % 1.0 (H) 0.0 - 0.0 %    Atypical Lymphocyte % 1.0 0.0 - 5.0 %    Neutrophils Absolute 7.25 (H) 1.70 - 7.00 10*3/mm3    Lymphocytes Absolute 1.02 0.70 - 3.10 10*3/mm3    Monocytes Absolute 0.74 0.10 - 0.90 10*3/mm3    Anisocytosis Slight/1+ None Seen    Macrocytes Slight/1+ None Seen    Poikilocytes Slight/1+ None Seen    RBC Fragments Slight/1+ None Seen    WBC Morphology Normal Normal    Platelet Morphology Normal Normal   ECG 12 Lead    Collection Time: 03/01/21  7:41 AM   Result Value Ref Range    QT Interval 377 ms   POC Glucose Once    Collection Time: 03/01/21 11:54 AM    Specimen: Blood   Result Value Ref Range    Glucose 147 (H) 70 - 105 mg/dL        Imaging:  FL Video Swallow With Speech Single Contrast  Narrative: DATE OF EXAM:  2/26/2021 8:45 AM     PROCEDURE:  FL VIDEO SWALLOW W SPEECH SINGLE-CONTRAST-     INDICATIONS:  Dysphagia, pneumonia.     COMPARISON:  No Comparisons Available     TECHNIQUE:   This examination was performed in conjunction with speech pathology.  Lateral video fluoroscopic evaluation of the  swallowing mechanism was  performed while correlate administering to the patient various  consistency food items mixed with barium.     Fluoroscopic Time:   2 minutes     FINDINGS:  Please see the speech pathologist's report with regard to detailed  findings from the procedure and feeding recommendations.      Impression: Technically successful modified barium swallow examination.     Electronically Signed By-Brock Kerr MD On:2/26/2021 12:43 PM  This report was finalized on [id number] by  Brock Kerr MD.  MRI Brain With Contrast  Narrative:    DATE OF EXAM:   2/26/2021 9:39 AM     PROCEDURE:   MRI BRAIN W CONTRAST-     INDICATIONS:   Abnormal noncontrasted MRI of the brain suspicious for metastatic  disease in the left temporal-occipital lobe. The patient has a history  of lung cancer.     COMPARISON:  MRI of the brain performed on 2/25/2021.     TECHNIQUE:   Routine magnetic resonance imaging was obtained of the brain after the  administration of 12 mL of ProHance contrast intravenously.     FINDINGS:   There is a single thick-walled ring-enhancing lesion in the medial left  occipital lobe measuring 1.1 x 1.0 cm. Vasogenic edema was seen on  yesterday's MRI of the brain. This represents metastatic disease until  proven otherwise. A small primary glioma or brain abscess can have a  similar imaging appearance. The patient has mild volume loss. There is  prominence of the sulci, fissures, and ventricles. There is an old  lacunar infarct in the left thalamic region measuring 6 mm in diameter.  There is decreased signal in the white matter tracts. When compared to  yesterday's MRI, this is felt to represent chronic microvascular  ischemic changes.      Impression: There is a single thick-walled ring-enhancing lesion in the medial  aspect of the left occipital lobe measuring 1.1 x 1.0 cm. There was  vasogenic edema on yesterday's MRI of the brain in this location. This  represents metastatic disease until proven  otherwise. A small primary  glioma or brain abscess can have a similar imaging appearance.     Electronically Signed By-Brock Kerr MD On:2021 10:19 AM  This report was finalized on [id number] by  Brock Kerr MD.       ASSESSMENT:    Acute on chronic respiratory failure with hypoxia (CMS/HCC)    Essential hypertension    Hx of aortic valve replacement    Squamous cell carcinoma of lung, right (CMS/HCC)    Diastolic CHF, acute (CMS/HCC)    Pneumonia due to infectious organism    Moderate malnutrition (CMS/HCC)    COPD with acute exacerbation (CMS/HCC)    Delirium  Left buttock -Unstageable pressure injury   Coccyx/theresa cleft with masd linear ulceration   Lesion in the medial aspect of the left occipital lobe   Delirium, possibly withdrawal from gabapentin and/or Suboxone    PLAN:  Wean steroids down   Neurosurgery not recommending resection. Patient and spouse both wanting to proceed with radiation therapy, Rad Onc planning SRS on 3/3 if patient can tolerate   Bronchodilator  Inhaled corticosteroids  Diet tube feeds with clear liquids speech and nutritionist following  Diuresis currently on Aldactone 25 mg and Lasix 20 mg IV every 12  Monitor urinary retention  Electrolytes/ glycemic control  DVT and GI prophylaxis.    Discussed with Dr Yaneli Dumont, APRN   3/1/2021  12:25 EST     I personally have examined  and interviewed the patient. I have reviewed the history, data, problems, assessment and plan with our NP.  Critical care time in direct medical management (   ) minutes   Lianna Groves MD, D,ABSM  3/1/2021    no

## 2025-07-15 NOTE — DISCHARGE NOTE PROVIDER - HOSPITAL COURSE
This is a 68yo Female with PMH of Crohn's, HTN, and GERD who presents to [facility] for orthopedic surgery. Patient s/p L4-S1 lami and L-4 PSF with Dr. Marquez on 7/15/2025. Patient tolerated the procedure well without any intraoperative complications. Patient tolerated physical therapy, weight bearing as tolerated and pain was controlled. Seen by medical attending for continuity of care and management and cleared for safe discharge. As per surgeon, the patient is stable and ready for discharge. DO NOT take any NSAIDS (motrin, advil, ibuprofen, aleve), Aspirin, Anti-inflammatory medications unless instructed by your orthopedic surgeon to continue. Avoid any heavy lifting, bending, squatting, or twisting motions. Keep dressing/incision clean, dry and intact, may remove dressing two days after discharge and leave incision open to air. Any sutures/staples to be removed on post-op day #14 at your office visit. Please follow up with Dr. Marquez in 2 weeks. Please follow up with your PMD for continuity of care and management as medications may have changed. Ketoconazole Counseling:   Patient counseled regarding improving absorption with orange juice.  Adverse effects include but are not limited to breast enlargement, headache, diarrhea, nausea, upset stomach, liver function test abnormalities, taste disturbance, and stomach pain.  There is a rare possibility of liver failure that can occur when taking ketoconazole. The patient understands that monitoring of LFTs may be required, especially at baseline. The patient verbalized understanding of the proper use and possible adverse effects of ketoconazole.  All of the patient's questions and concerns were addressed.

## 2025-07-15 NOTE — PHYSICAL THERAPY INITIAL EVALUATION ADULT - ADDITIONAL COMMENTS
Pt lives in a private house with , +3 steps to enter, +5 steps inside to negotiate, was independent with all functional mobility and ADL performance without use of an assistive device.     Pt left semi-supine in bed, all lines intact, all needs in reach, bed alarm set, in NAD. GLORIA Plasencia aware. Heart rate 98 beats per minute.

## 2025-07-15 NOTE — PHYSICAL THERAPY INITIAL EVALUATION ADULT - GENERAL OBSERVATIONS, REHAB EVAL
Pt received semi-supine in bed, +drain, all lines intact, in NAD.  at bedside. Pt agreeable to PT evaluation

## 2025-07-15 NOTE — PHYSICAL THERAPY INITIAL EVALUATION ADULT - GAIT DEVIATIONS NOTED, PT EVAL
decreased yuan/increased time in double stance/decreased step length/decreased stride length/decreased weight-shifting ability

## 2025-07-15 NOTE — PROGRESS NOTE ADULT - ASSESSMENT
A/P: Patient is a 69y y/o Female s/p L4-S1 lami and L-4 PSF, POD #0  -Pain control/analgesia  -Antibiotics - Ancef postop  -Incentive spirometry  -Venodynes  -F/U AM Labs  -PT/OT/WBAT  -Valdez - monitor output  -Monitor RENE output  -Notify orthopedics with any questions   A/P: Patient is a 69y y/o Female s/p L4-S1 lami and L-4 PSF, POD #0  -Pain control/analgesia  -Antibiotics - Ancef postop  -Incentive spirometry  -Venodynes  -F/U AM Labs  -PT/OT/WBAT  -Valdez - monitor output  -Monitor RENE output  -Notify orthopedics with any questions    Patient seen post-operatively doing well, Resolved pre-operative leg pain, incisional pain controlled. I discussed with her son and with the above, Dr. Mohit Marquez.

## 2025-07-15 NOTE — DISCHARGE NOTE PROVIDER - NSDCMRMEDTOKEN_GEN_ALL_CORE_FT
cyclobenzaprine 5 mg oral tablet: 1 tab(s) orally once a day as needed for  muscle spasm  hydroCHLOROthiazide 12.5 mg oral tablet: 1 tab(s) orally 2 times a day  losartan: 150 milligram(s) orally once a day  mesalamine 500 mg oral capsule, extended release: 2 cap(s) orally 2 times a day  multivitamin: 1 tab(s) orally once a day   acetaminophen 325 mg oral tablet: 3 tab(s) orally every 8 hours  cyclobenzaprine 5 mg oral tablet: 1 tab(s) orally 3 times a day as needed for  muscle spasm MDD: 3  gabapentin 100 mg oral capsule: 1 cap(s) orally 3 times a day MDD: 3  hydroCHLOROthiazide 12.5 mg oral tablet: 1 tab(s) orally 2 times a day  losartan: 150 milligram(s) orally once a day  mesalamine 500 mg oral capsule, extended release: 2 cap(s) orally 2 times a day  methylPREDNISolone 4 mg oral tablet: 4 milligram(s) orally every 6 hours See on packet  multivitamin: 1 tab(s) orally once a day  naloxone 4 mg/0.1 mL nasal spray: 1 spray(s) intranasally once as needed for opioid reversal  oxyCODONE 5 mg oral tablet: 1 tab(s) orally every 6 hours as needed for  severe pain MDD: 6  pantoprazole 40 mg oral delayed release tablet: 1 tab(s) orally once a day (before a meal)  polyethylene glycol 3350 oral powder for reconstitution: 17 gram(s) orally once a day (at bedtime)  senna leaf extract oral tablet: 2 tab(s) orally once a day (at bedtime)  traMADol 50 mg oral tablet: 1 tab(s) orally every 8 hours as needed for Mild Pain (1 - 3) MDD: 3

## 2025-07-15 NOTE — ASU PREOP CHECKLIST - BOWEL PREP
[facility]  Colonoscopy Report      Liu De Leonec Patient Status:  Outpatient Procedure    1964 MRN [id number]   Location [facility] Attending Migdalia Collins MD       DATE OF OPERATION: 2022     PREOPERATIVE DIAGNOSIS:   1. Screening colonoscopy history of polyps    POSTOPERATIVE DIAGNOSIS:   1. Small polyp in the rectum removed cold biopsy forceps.  Rare diverticulosis.  Grade 1 internal hemorrhoids, otherwise normal colonoscopy    SURGERY PERFORMED:   Colonoscopy with biopsy    SURGEON: Migdalia Collins MD    ANESTHESIA: MAC      REPORT OF OPERATION:     The procedure was reviewed with the patient. The patient is aware of the indications. The risks of bleeding, perforation and anesthetic complications have been reviewed. The possibility of missed lesions were reviewed.    After the patient was placed in the left lateral decubitus position,the Olympus video colonoscope was inserted into the rectum and was advanced to the cecum.  The cecal location was verified by visualization of the appendiceal orifice and ileocecal valve.   The scope was withdrawn and the mucosa was observed for abnormalities.  The bowel preparation was good  The withdrawal time was 7 minutes  Complications: None  Estimated blood loss: Less than 5 mL's  Implants or grafts: None    FINDINGS:    Colonoscopy findings: Cecum ascending transverse descending sigmoid colon.  Be normal.  In the rectum there is a 0.6 cm polyp removed multiple cold biopsies.  Rectum also noted for grade 1 internal hemorrhoids.  Otherwise normal colonoscopy.      RECOMMENDATIONS:  Repeat colonoscopy in 5 years for ongoing surveillance, otherwise patient to follow-up with Dr. Delaney for ongoing medical care      Migdalia Collins MD  GI [facility]  2022 3:01 PM      
n/a

## 2025-07-16 DIAGNOSIS — K21.9 GASTRO-ESOPHAGEAL REFLUX DISEASE WITHOUT ESOPHAGITIS: ICD-10-CM

## 2025-07-16 DIAGNOSIS — K50.90 CROHN'S DISEASE, UNSPECIFIED, WITHOUT COMPLICATIONS: ICD-10-CM

## 2025-07-16 DIAGNOSIS — I10 ESSENTIAL (PRIMARY) HYPERTENSION: ICD-10-CM

## 2025-07-16 DIAGNOSIS — D72.829 ELEVATED WHITE BLOOD CELL COUNT, UNSPECIFIED: ICD-10-CM

## 2025-07-16 DIAGNOSIS — M48.00 SPINAL STENOSIS, SITE UNSPECIFIED: ICD-10-CM

## 2025-07-16 LAB
ANION GAP SERPL CALC-SCNC: 12 MMOL/L — SIGNIFICANT CHANGE UP (ref 7–14)
BASOPHILS # BLD AUTO: 0.01 K/UL — SIGNIFICANT CHANGE UP (ref 0–0.2)
BASOPHILS NFR BLD AUTO: 0.1 % — SIGNIFICANT CHANGE UP (ref 0–2)
BUN SERPL-MCNC: 11 MG/DL — SIGNIFICANT CHANGE UP (ref 7–23)
CALCIUM SERPL-MCNC: 8.9 MG/DL — SIGNIFICANT CHANGE UP (ref 8.4–10.5)
CHLORIDE SERPL-SCNC: 104 MMOL/L — SIGNIFICANT CHANGE UP (ref 98–107)
CO2 SERPL-SCNC: 24 MMOL/L — SIGNIFICANT CHANGE UP (ref 22–31)
CREAT SERPL-MCNC: 0.54 MG/DL — SIGNIFICANT CHANGE UP (ref 0.5–1.3)
EGFR: 101 ML/MIN/1.73M2 — SIGNIFICANT CHANGE UP
EGFR: 101 ML/MIN/1.73M2 — SIGNIFICANT CHANGE UP
EOSINOPHIL # BLD AUTO: 0 K/UL — SIGNIFICANT CHANGE UP (ref 0–0.5)
EOSINOPHIL NFR BLD AUTO: 0 % — SIGNIFICANT CHANGE UP (ref 0–6)
GLUCOSE SERPL-MCNC: 146 MG/DL — HIGH (ref 70–99)
HCT VFR BLD CALC: 35.5 % — SIGNIFICANT CHANGE UP (ref 34.5–45)
HGB BLD-MCNC: 11.9 G/DL — SIGNIFICANT CHANGE UP (ref 11.5–15.5)
IMM GRANULOCYTES # BLD AUTO: 0.07 K/UL — SIGNIFICANT CHANGE UP (ref 0–0.07)
IMM GRANULOCYTES NFR BLD AUTO: 0.5 % — SIGNIFICANT CHANGE UP (ref 0–0.9)
LYMPHOCYTES # BLD AUTO: 1.15 K/UL — SIGNIFICANT CHANGE UP (ref 1–3.3)
LYMPHOCYTES NFR BLD AUTO: 8.6 % — LOW (ref 13–44)
MCHC RBC-ENTMCNC: 27.1 PG — SIGNIFICANT CHANGE UP (ref 27–34)
MCHC RBC-ENTMCNC: 33.5 G/DL — SIGNIFICANT CHANGE UP (ref 32–36)
MCV RBC AUTO: 80.9 FL — SIGNIFICANT CHANGE UP (ref 80–100)
MONOCYTES # BLD AUTO: 0.85 K/UL — SIGNIFICANT CHANGE UP (ref 0–0.9)
MONOCYTES NFR BLD AUTO: 6.4 % — SIGNIFICANT CHANGE UP (ref 2–14)
NEUTROPHILS # BLD AUTO: 11.23 K/UL — HIGH (ref 1.8–7.4)
NEUTROPHILS NFR BLD AUTO: 84.4 % — HIGH (ref 43–77)
NRBC # BLD AUTO: 0 K/UL — SIGNIFICANT CHANGE UP (ref 0–0)
NRBC # FLD: 0 K/UL — SIGNIFICANT CHANGE UP (ref 0–0)
NRBC BLD AUTO-RTO: 0 /100 WBCS — SIGNIFICANT CHANGE UP (ref 0–0)
PLATELET # BLD AUTO: 272 K/UL — SIGNIFICANT CHANGE UP (ref 150–400)
PMV BLD: 9.1 FL — SIGNIFICANT CHANGE UP (ref 7–13)
POTASSIUM SERPL-MCNC: 4 MMOL/L — SIGNIFICANT CHANGE UP (ref 3.5–5.3)
POTASSIUM SERPL-SCNC: 4 MMOL/L — SIGNIFICANT CHANGE UP (ref 3.5–5.3)
RBC # BLD: 4.39 M/UL — SIGNIFICANT CHANGE UP (ref 3.8–5.2)
RBC # FLD: 13.1 % — SIGNIFICANT CHANGE UP (ref 10.3–14.5)
SODIUM SERPL-SCNC: 140 MMOL/L — SIGNIFICANT CHANGE UP (ref 135–145)
WBC # BLD: 13.31 K/UL — HIGH (ref 3.8–10.5)
WBC # FLD AUTO: 13.31 K/UL — HIGH (ref 3.8–10.5)

## 2025-07-16 PROCEDURE — 99223 1ST HOSP IP/OBS HIGH 75: CPT

## 2025-07-16 RX ADMIN — GABAPENTIN 100 MILLIGRAM(S): 400 CAPSULE ORAL at 13:11

## 2025-07-16 RX ADMIN — CYCLOBENZAPRINE HYDROCHLORIDE 5 MILLIGRAM(S): 15 CAPSULE, EXTENDED RELEASE ORAL at 11:02

## 2025-07-16 RX ADMIN — Medication 40 MILLIGRAM(S): at 06:25

## 2025-07-16 RX ADMIN — CYCLOBENZAPRINE HYDROCHLORIDE 5 MILLIGRAM(S): 15 CAPSULE, EXTENDED RELEASE ORAL at 17:16

## 2025-07-16 RX ADMIN — OXYCODONE HYDROCHLORIDE 10 MILLIGRAM(S): 30 TABLET ORAL at 21:40

## 2025-07-16 RX ADMIN — Medication 975 MILLIGRAM(S): at 13:11

## 2025-07-16 RX ADMIN — Medication 2 TABLET(S): at 21:39

## 2025-07-16 RX ADMIN — Medication 1 TABLET(S): at 13:11

## 2025-07-16 RX ADMIN — Medication 1000 MILLILITER(S): at 06:10

## 2025-07-16 RX ADMIN — SODIUM CHLORIDE 30 MILLILITER(S): 9 INJECTION, SOLUTION INTRAVENOUS at 00:38

## 2025-07-16 RX ADMIN — OXYCODONE HYDROCHLORIDE 5 MILLIGRAM(S): 30 TABLET ORAL at 17:15

## 2025-07-16 RX ADMIN — GABAPENTIN 100 MILLIGRAM(S): 400 CAPSULE ORAL at 06:25

## 2025-07-16 RX ADMIN — Medication 100 MILLIGRAM(S): at 00:38

## 2025-07-16 RX ADMIN — Medication 975 MILLIGRAM(S): at 13:26

## 2025-07-16 RX ADMIN — POLYETHYLENE GLYCOL 3350 17 GRAM(S): 17 POWDER, FOR SOLUTION ORAL at 21:39

## 2025-07-16 RX ADMIN — GABAPENTIN 100 MILLIGRAM(S): 400 CAPSULE ORAL at 21:40

## 2025-07-16 RX ADMIN — Medication 975 MILLIGRAM(S): at 06:27

## 2025-07-16 RX ADMIN — Medication 1000 MILLIGRAM(S): at 06:26

## 2025-07-16 RX ADMIN — CYCLOBENZAPRINE HYDROCHLORIDE 5 MILLIGRAM(S): 15 CAPSULE, EXTENDED RELEASE ORAL at 21:40

## 2025-07-16 RX ADMIN — Medication 975 MILLIGRAM(S): at 07:27

## 2025-07-16 RX ADMIN — OXYCODONE HYDROCHLORIDE 5 MILLIGRAM(S): 30 TABLET ORAL at 18:06

## 2025-07-16 RX ADMIN — Medication 1000 MILLIGRAM(S): at 17:15

## 2025-07-16 RX ADMIN — OXYCODONE HYDROCHLORIDE 10 MILLIGRAM(S): 30 TABLET ORAL at 22:40

## 2025-07-16 NOTE — PROGRESS NOTE ADULT - ASSESSMENT
A/P: Patient is a 69y y/o Female s/p L4-S1 lami and L-4 PSF,  on 7/15. Recovering well on the floor    -Pain control/analgesia  -Antibiotics - Ancef postop  -Incentive spirometry  -Venodynes  -F/U AM Labs  -PT/OT/WBAT  -Valdez - monitor output  -Monitor RENE output- management per PRS  -Notify orthopedics with any questions    For all questions related to patient care, please reach out to the on-call team via the pager.     Teresa Trejo, PGY 4  Orthopaedic Surgery  [facility] [phone]  [facility] [phone]  [facility] [phone]   A/P: Patient is a 69y y/o Female s/p L4-S1 lami and L-4 PSF,  on 7/15. Recovering well on the floor    -Pain control/analgesia  -Antibiotics - Ancef postop  -Incentive spirometry  -Venodynes  -F/U AM Labs  -PT/OT/WBAT  -Valdez - monitor output  -Monitor RENE output- management per PRS  -Notify orthopedics with any questions    For all questions related to patient care, please reach out to the on-call team via the pager.     Teresa Trejo, PGY 4  Orthopaedic Surgery  [facility] [phone]  [facility] [phone]  [facility] [phone]    Patient seen on rounds today doing well. Complete resolution of her pre-operative leg pains. Incisional pain controlled. Ambulated with PT. I discussed with the above and with her  at bedside, Dr. Mohit Marquez.

## 2025-07-16 NOTE — CONSULT NOTE ADULT - SUBJECTIVE AND OBJECTIVE BOX
[facility] Division of Hospital Medicine  Jefferson Rios MD  Contact via [employer] Teams (Mon-Fri 8AM-4PM)  Otherwise: contact Hospitalist in Charge at [phone]    Patient is a 69y old  Female who presents with a chief complaint of s/p L4-S1 lami and L-4 PSF (15 Jul 2025 13:40)      HPI:  69F Crohn's Dz, HTN, GERD, p/w spinal stenosis s/p L4-S1 lami, L4-5 fusion w/ flap closure on 7/15 c/b post-op leukocytosis. Pain is rated 7/10; localized at surgical site, achy in nature, non-radiating, worse with movement, but improved with pain meds.  No change in bowel movement pattern at this time.  Able to ambulate with PT w/ RW.  No F/C, N/V, CP, SOB, Cough, lightheadedness, dizziness, abdominal pain, diarrhea, dysuria.    Pain Symptoms if applicable:             	                         none	   mild         moderate         severe  Pain:	            7                0	    1-3	     4-6	         7-10  Location:	Surgical site  Modifying factors:	Worse with movement  Associated symptoms:	    Allergies    No Known Allergies    Intolerances        HOME MEDICATIONS: Reviewed    MEDICATIONS  (STANDING):  acetaminophen     Tablet .. 975 milliGRAM(s) Oral every 8 hours  gabapentin 100 milliGRAM(s) Oral three times a day  hydrochlorothiazide 12.5 milliGRAM(s) Oral two times a day  lactated ringers. 1000 milliLiter(s) (30 mL/Hr) IV Continuous <Continuous>  losartan 150 milliGRAM(s) Oral daily  mesalamine ER Capsule 1000 milliGRAM(s) Oral two times a day  multivitamin 1 Tablet(s) Oral daily  pantoprazole    Tablet 40 milliGRAM(s) Oral before breakfast  polyethylene glycol 3350 17 Gram(s) Oral at bedtime  senna 2 Tablet(s) Oral at bedtime    MEDICATIONS  (PRN):  cyclobenzaprine 5 milliGRAM(s) Oral three times a day PRN Muscle Spasm  ondansetron   Disintegrating Tablet 4 milliGRAM(s) Oral every 6 hours PRN Nausea and/or Vomiting  oxyCODONE    IR 10 milliGRAM(s) Oral every 4 hours PRN Severe Pain (7 - 10)  oxyCODONE    IR 5 milliGRAM(s) Oral every 4 hours PRN Moderate Pain (4 - 6)  traMADol 50 milliGRAM(s) Oral every 8 hours PRN Mild Pain (1 - 3)      PAST MEDICAL & SURGICAL HISTORY:  GERD (gastroesophageal reflux disease)      Calculus of bile duct w/o obstruction, cholangitis, or cholecystitis      H/O Crohn's disease      History of spinal stenosis      Spinal stenosis, lumbosacral region      Spondylolisthesis, lumbar region      H/O:         History of cholecystectomy      H/O endoscopy      H/O colonoscopy          SOCIAL HISTORY:  No tobacco/alcohol use/abuse.    FAMILY HISTORY:  Family history of diabetes mellitus (Father)        REVIEW OF SYSTEMS:    CONSTITUTIONAL: No fever, weight loss, or fatigue  EYES: No eye pain, visual disturbances, or discharge  ENMT:  No difficulty hearing, tinnitus, vertigo; No sinus or throat pain  NECK: No pain or stiffness  RESPIRATORY: No cough, wheezing, chills or hemoptysis; No shortness of breath  CARDIOVASCULAR: No chest pain, palpitations, dizziness, or leg swelling  GASTROINTESTINAL: No abdominal or epigastric pain. No nausea, vomiting, or hematemesis; No diarrhea or constipation. No melena or hematochezia.  GENITOURINARY: No dysuria, frequency, hematuria, or incontinence  NEUROLOGICAL: No headaches, memory loss, loss of strength, numbness, or tremors  SKIN: No itching, burning, rashes, or lesions   LYMPH NODES: No enlarged glands  ENDOCRINE: No heat or cold intolerance; No hair loss  MUSCULOSKELETAL: Back pain  PSYCHIATRIC: No depression, anxiety, mood swings, or difficulty sleeping  HEME/LYMPH: No easy bruising, or bleeding gums  ALLERGY AND IMMUNOLOGIC: No hives or eczema    [] Unable to obtain due to poor mental status    Vital Signs Last 24 Hrs  T(C): 36.7 (2025 09:12), Max: 36.7 (15 Jul 2025 11:50)  T(F): 98.1 (2025 09:12), Max: 98.1 (15 Jul 2025 11:50)  HR: 62 (2025 09:12) (62 - 101)  BP: 107/62 (2025 09:12) (103/59 - 126/54)  BP(mean): 76 (15 Jul 2025 15:00) (71 - 87)  RR: 17 (2025 09:12) (14 - 20)  SpO2: 95% (2025 09:12) (93% - 100%)    Parameters below as of 2025 09:12  Patient On (Oxygen Delivery Method): room air      CAPILLARY BLOOD GLUCOSE          PHYSICAL EXAM:    CONSTITUTIONAL: NAD, well-developed, well-groomed  EYES: PERRLA; conjunctiva and sclera clear  ENMT: Moist oral mucosa, no pharyngeal injection or exudates; normal dentition  NECK: Supple, no palpable masses; no thyromegaly  RESPIRATORY: Normal respiratory effort; lungs are clear to auscultation bilaterally  CARDIOVASCULAR: Regular rate and rhythm, normal S1 and S2, no murmur/rub/gallop; No lower extremity edema; Peripheral pulses are 2+ bilaterally  ABDOMEN: Nontender to palpation, normoactive bowel sounds, no rebound/guarding; No hepatosplenomegaly  MUSCULOSKELETAL:  Ambulates with RW; no clubbing or cyanosis of digits; no joint swelling or tenderness to palpation  BACK: Drain in place, limited ROM  PSYCH: A+O to person, place, and time; affect appropriate  NEUROLOGY: CN 2-12 are intact and symmetric; no gross sensory deficits   SKIN: No rashes; no palpable lesions, surgical dressing C/D/I    LABS:                        11.9   13.31 )-----------( 272      ( 2025 05:15 )             35.5     07-16    140  |  104  |  11  ----------------------------<  146[H]  4.0   |  24  |  0.54    Ca    8.9      2025 05:15        Urinalysis Basic - ( 2025 05:15 )    Color: x / Appearance: x / SG: x / pH: x  Gluc: 146 mg/dL / Ketone: x  / Bili: x / Urobili: x   Blood: x / Protein: x / Nitrite: x   Leuk Esterase: x / RBC: x / WBC x   Sq Epi: x / Non Sq Epi: x / Bacteria: x      CAPILLARY BLOOD GLUCOSE      POCT Blood Glucose.: 103 mg/dL (15 Jul 2025 06:02)      RADIOLOGY & ADDITIONAL STUDIES:    Imaging:   Personally Reviewed:  [ ] YES               EKG:   Personally Reviewed:  [ ] YES       Care Discussed with Consultant(s)/Other Providers:  Care Discussed with Primary Team.      [ ] Increased delirium risk  [ ] Delirium and other risks can be reduced by:          -early ambulation          -minimizing "tethers" - IV, oxygen, catheters, etc          -avoiding hypnotics and sedatives          -maintaining hydration/nutrition          -avoid anticholinergics - diphenhydramine, etc          -pain control          -supportive environment

## 2025-07-16 NOTE — CONSULT NOTE ADULT - ASSESSMENT
69F Crohn's Dz, HTN, GERD, p/w spinal stenosis s/p L4-S1 lami, L4-5 fusion w/ flap closure on 7/15 c/b post-op leukocytosis.

## 2025-07-16 NOTE — CONSULT NOTE ADULT - PROBLEM SELECTOR RECOMMENDATION 9
- s/p L4-S1 lami, L4-5 fusion w/ flap closure on 7/15  - Pain control with flexeril PRN, Neurontin, OxyIR PRN, Ultram PRN  - Bowel regiment - Senna/Miralax  - Surgical site management as per ortho  - PT/OT eval for safe dispo  - VTE ppx - IPC stockins

## 2025-07-16 NOTE — OCCUPATIONAL THERAPY INITIAL EVALUATION ADULT - GENERAL OBSERVATIONS, REHAB EVAL
Patient found semi-reclined in bed, NAD, and able to follow directions. Vitals: HR . Patient agreeable to participate in skilled OT evaluation. Patient found semi-reclined in bed, NAD, and able to follow directions. Vitals: HR 62. Patient agreeable to participate in skilled OT evaluation.

## 2025-07-17 ENCOUNTER — TRANSCRIPTION ENCOUNTER (OUTPATIENT)
Age: 70
End: 2025-07-17

## 2025-07-17 LAB
ANION GAP SERPL CALC-SCNC: 10 MMOL/L — SIGNIFICANT CHANGE UP (ref 7–14)
BUN SERPL-MCNC: 13 MG/DL — SIGNIFICANT CHANGE UP (ref 7–23)
CALCIUM SERPL-MCNC: 8.6 MG/DL — SIGNIFICANT CHANGE UP (ref 8.4–10.5)
CHLORIDE SERPL-SCNC: 102 MMOL/L — SIGNIFICANT CHANGE UP (ref 98–107)
CO2 SERPL-SCNC: 27 MMOL/L — SIGNIFICANT CHANGE UP (ref 22–31)
CREAT SERPL-MCNC: 0.61 MG/DL — SIGNIFICANT CHANGE UP (ref 0.5–1.3)
EGFR: 98 ML/MIN/1.73M2 — SIGNIFICANT CHANGE UP
EGFR: 98 ML/MIN/1.73M2 — SIGNIFICANT CHANGE UP
GLUCOSE SERPL-MCNC: 121 MG/DL — HIGH (ref 70–99)
HCT VFR BLD CALC: 34.3 % — LOW (ref 34.5–45)
HGB BLD-MCNC: 11.1 G/DL — LOW (ref 11.5–15.5)
MCHC RBC-ENTMCNC: 26.4 PG — LOW (ref 27–34)
MCHC RBC-ENTMCNC: 32.4 G/DL — SIGNIFICANT CHANGE UP (ref 32–36)
MCV RBC AUTO: 81.7 FL — SIGNIFICANT CHANGE UP (ref 80–100)
NRBC # BLD AUTO: 0 K/UL — SIGNIFICANT CHANGE UP (ref 0–0)
NRBC # FLD: 0 K/UL — SIGNIFICANT CHANGE UP (ref 0–0)
NRBC BLD AUTO-RTO: 0 /100 WBCS — SIGNIFICANT CHANGE UP (ref 0–0)
PLATELET # BLD AUTO: 261 K/UL — SIGNIFICANT CHANGE UP (ref 150–400)
PMV BLD: 9.3 FL — SIGNIFICANT CHANGE UP (ref 7–13)
POTASSIUM SERPL-MCNC: 4 MMOL/L — SIGNIFICANT CHANGE UP (ref 3.5–5.3)
POTASSIUM SERPL-SCNC: 4 MMOL/L — SIGNIFICANT CHANGE UP (ref 3.5–5.3)
RBC # BLD: 4.2 M/UL — SIGNIFICANT CHANGE UP (ref 3.8–5.2)
RBC # FLD: 13.5 % — SIGNIFICANT CHANGE UP (ref 10.3–14.5)
SODIUM SERPL-SCNC: 139 MMOL/L — SIGNIFICANT CHANGE UP (ref 135–145)
WBC # BLD: 13.83 K/UL — HIGH (ref 3.8–10.5)
WBC # FLD AUTO: 13.83 K/UL — HIGH (ref 3.8–10.5)

## 2025-07-17 RX ORDER — SODIUM CHLORIDE 9 G/1000ML
1000 INJECTION, SOLUTION INTRAVENOUS
Refills: 0 | Status: DISCONTINUED | OUTPATIENT
Start: 2025-07-17 | End: 2025-07-19

## 2025-07-17 RX ORDER — TIZANIDINE 4 MG/1
4 TABLET ORAL EVERY 8 HOURS
Refills: 0 | Status: DISCONTINUED | OUTPATIENT
Start: 2025-07-17 | End: 2025-07-19

## 2025-07-17 RX ORDER — METHOCARBAMOL 500 MG/1
500 TABLET, FILM COATED ORAL ONCE
Refills: 0 | Status: DISCONTINUED | OUTPATIENT
Start: 2025-07-17 | End: 2025-07-17

## 2025-07-17 RX ORDER — SODIUM CHLORIDE 9 G/1000ML
1000 INJECTION, SOLUTION INTRAVENOUS ONCE
Refills: 0 | Status: COMPLETED | OUTPATIENT
Start: 2025-07-17 | End: 2025-07-17

## 2025-07-17 RX ADMIN — GABAPENTIN 100 MILLIGRAM(S): 400 CAPSULE ORAL at 21:49

## 2025-07-17 RX ADMIN — Medication 975 MILLIGRAM(S): at 18:03

## 2025-07-17 RX ADMIN — TIZANIDINE 4 MILLIGRAM(S): 4 TABLET ORAL at 09:43

## 2025-07-17 RX ADMIN — Medication 1000 MILLIGRAM(S): at 05:49

## 2025-07-17 RX ADMIN — POLYETHYLENE GLYCOL 3350 17 GRAM(S): 17 POWDER, FOR SOLUTION ORAL at 21:49

## 2025-07-17 RX ADMIN — Medication 975 MILLIGRAM(S): at 01:42

## 2025-07-17 RX ADMIN — Medication 975 MILLIGRAM(S): at 10:00

## 2025-07-17 RX ADMIN — Medication 975 MILLIGRAM(S): at 09:43

## 2025-07-17 RX ADMIN — Medication 1000 MILLIGRAM(S): at 18:03

## 2025-07-17 RX ADMIN — SODIUM CHLORIDE 1000 MILLILITER(S): 9 INJECTION, SOLUTION INTRAVENOUS at 14:16

## 2025-07-17 RX ADMIN — Medication 1 TABLET(S): at 14:17

## 2025-07-17 RX ADMIN — Medication 975 MILLIGRAM(S): at 02:42

## 2025-07-17 RX ADMIN — OXYCODONE HYDROCHLORIDE 10 MILLIGRAM(S): 30 TABLET ORAL at 10:21

## 2025-07-17 RX ADMIN — CYCLOBENZAPRINE HYDROCHLORIDE 5 MILLIGRAM(S): 15 CAPSULE, EXTENDED RELEASE ORAL at 05:50

## 2025-07-17 RX ADMIN — Medication 40 MILLIGRAM(S): at 05:50

## 2025-07-17 RX ADMIN — OXYCODONE HYDROCHLORIDE 10 MILLIGRAM(S): 30 TABLET ORAL at 10:55

## 2025-07-17 RX ADMIN — Medication 2 TABLET(S): at 21:48

## 2025-07-17 RX ADMIN — LOSARTAN POTASSIUM 150 MILLIGRAM(S): 100 TABLET, FILM COATED ORAL at 05:51

## 2025-07-17 RX ADMIN — GABAPENTIN 100 MILLIGRAM(S): 400 CAPSULE ORAL at 05:50

## 2025-07-17 RX ADMIN — SODIUM CHLORIDE 100 MILLILITER(S): 9 INJECTION, SOLUTION INTRAVENOUS at 15:57

## 2025-07-17 NOTE — PROGRESS NOTE ADULT - ASSESSMENT
A/P: Patient is a 69y y/o Female s/p L4-S1 lami and L-4 PSF,  on 7/15. Recovering well on the floor    -Pain control/analgesia  -Incentive spirometry  -Venodynes  -F/U AM Labs  -PT/OT/WBAT  -Valdez - monitor output  -Monitor RENE output- management per PRS  -Notify orthopedics with any questions    For all questions related to patient care, please reach out to the on-call team via the pager.     Teresa Trejo, PGY 4  Orthopaedic Surgery  [facility] [phone]  [facility] [phone]  [facility] [phone]   A/P: Patient is a 69y y/o Female s/p L4-S1 lami and L-4 PSF,  on 7/15. Recovering well on the floor    -Pain control/analgesia  -Incentive spirometry  -Venodynes  -F/U AM Labs  -PT/OT/WBAT  -Valdez - monitor output  -Monitor RENE output- management per PRS  -Notify orthopedics with any questions    For all questions related to patient care, please reach out to the on-call team via the pager.     Teresa Trejo, PGY 4  Orthopaedic Surgery  [facility] [phone]  [facility] [phone]  [facility] [phone]    Patient doing well, ambulating. Resolved pre-operative leg pains. Incisional pain controlled. Discussed with Dr. Pineda and the above, Dr. Mohit Marquez.

## 2025-07-17 NOTE — DISCHARGE NOTE NURSING/CASE MANAGEMENT/SOCIAL WORK - NSDCPNINST_GEN_ALL_CORE
Keep incision and dressing clean and dry, free from moisture. Monitor for signs/symptoms of infections including but not limited to fever greater than 100.4 F, chills, change in mental status, purulent drainage from incision site. Contact provider if any of these signs are observed.

## 2025-07-17 NOTE — PROGRESS NOTE ADULT - ASSESSMENT
ASSESSMENT/PLAN:   BRIAN ROÍS is a 69yFemale s/p PSF with closure by PRS.     - Drain removed today.  - Pain control  - Advance diet as tolerated, ensure pt taking adequate protein  - Appreciate rest of care per primary team    Kota Pro - PGY1  Plastic Surgery   ----------------------------  FELICITA Pager: [phone]  [facility] Pager: [phone]  Available on Teams

## 2025-07-17 NOTE — DISCHARGE NOTE NURSING/CASE MANAGEMENT/SOCIAL WORK - FINANCIAL ASSISTANCE
[facility] provides services at a reduced cost to those who are determined to be eligible through [facility]’s financial assistance program. Information regarding [facility]’s financial assistance program can be found by going to [email or web address] or by calling [phone].

## 2025-07-17 NOTE — DISCHARGE NOTE NURSING/CASE MANAGEMENT/SOCIAL WORK - PATIENT PORTAL LINK FT
You can access the FollowMyHealth Patient Portal offered by [facility] by registering at the following website: [email or web address]. By joining [employer]’s FollowMyHealth portal, you will also be able to view your health information using other applications (apps) compatible with our system.

## 2025-07-18 VITALS
RESPIRATION RATE: 18 BRPM | DIASTOLIC BLOOD PRESSURE: 67 MMHG | OXYGEN SATURATION: 99 % | SYSTOLIC BLOOD PRESSURE: 145 MMHG | TEMPERATURE: 99 F | HEART RATE: 95 BPM

## 2025-07-18 LAB
ANION GAP SERPL CALC-SCNC: 10 MMOL/L — SIGNIFICANT CHANGE UP (ref 7–14)
BUN SERPL-MCNC: 10 MG/DL — SIGNIFICANT CHANGE UP (ref 7–23)
CALCIUM SERPL-MCNC: 8.6 MG/DL — SIGNIFICANT CHANGE UP (ref 8.4–10.5)
CHLORIDE SERPL-SCNC: 100 MMOL/L — SIGNIFICANT CHANGE UP (ref 98–107)
CO2 SERPL-SCNC: 29 MMOL/L — SIGNIFICANT CHANGE UP (ref 22–31)
CREAT SERPL-MCNC: 0.54 MG/DL — SIGNIFICANT CHANGE UP (ref 0.5–1.3)
EGFR: 101 ML/MIN/1.73M2 — SIGNIFICANT CHANGE UP
EGFR: 101 ML/MIN/1.73M2 — SIGNIFICANT CHANGE UP
GLUCOSE SERPL-MCNC: 100 MG/DL — HIGH (ref 70–99)
HCT VFR BLD CALC: 34.9 % — SIGNIFICANT CHANGE UP (ref 34.5–45)
HGB BLD-MCNC: 11.2 G/DL — LOW (ref 11.5–15.5)
MCHC RBC-ENTMCNC: 26.5 PG — LOW (ref 27–34)
MCHC RBC-ENTMCNC: 32.1 G/DL — SIGNIFICANT CHANGE UP (ref 32–36)
MCV RBC AUTO: 82.5 FL — SIGNIFICANT CHANGE UP (ref 80–100)
NRBC # BLD AUTO: 0 K/UL — SIGNIFICANT CHANGE UP (ref 0–0)
NRBC # FLD: 0 K/UL — SIGNIFICANT CHANGE UP (ref 0–0)
NRBC BLD AUTO-RTO: 0 /100 WBCS — SIGNIFICANT CHANGE UP (ref 0–0)
PLATELET # BLD AUTO: 285 K/UL — SIGNIFICANT CHANGE UP (ref 150–400)
PMV BLD: 9.5 FL — SIGNIFICANT CHANGE UP (ref 7–13)
POTASSIUM SERPL-MCNC: 3.7 MMOL/L — SIGNIFICANT CHANGE UP (ref 3.5–5.3)
POTASSIUM SERPL-SCNC: 3.7 MMOL/L — SIGNIFICANT CHANGE UP (ref 3.5–5.3)
RBC # BLD: 4.23 M/UL — SIGNIFICANT CHANGE UP (ref 3.8–5.2)
RBC # FLD: 13.3 % — SIGNIFICANT CHANGE UP (ref 10.3–14.5)
SODIUM SERPL-SCNC: 139 MMOL/L — SIGNIFICANT CHANGE UP (ref 135–145)
SURGICAL PATHOLOGY STUDY: SIGNIFICANT CHANGE UP
WBC # BLD: 11.71 K/UL — HIGH (ref 3.8–10.5)
WBC # FLD AUTO: 11.71 K/UL — HIGH (ref 3.8–10.5)

## 2025-07-18 RX ORDER — METHYLPREDNISOLONE ACETATE 80 MG/ML
4 INJECTION, SUSPENSION INTRA-ARTICULAR; INTRALESIONAL; INTRAMUSCULAR; SOFT TISSUE AT BEDTIME
Refills: 0 | Status: CANCELLED | OUTPATIENT
Start: 2025-07-20 | End: 2025-07-19

## 2025-07-18 RX ORDER — METHYLPREDNISOLONE ACETATE 80 MG/ML
8 INJECTION, SUSPENSION INTRA-ARTICULAR; INTRALESIONAL; INTRAMUSCULAR; SOFT TISSUE AT BEDTIME
Refills: 0 | Status: DISCONTINUED | OUTPATIENT
Start: 2025-07-19 | End: 2025-07-19

## 2025-07-18 RX ORDER — BISACODYL 5 MG
10 TABLET, DELAYED RELEASE (ENTERIC COATED) ORAL ONCE
Refills: 0 | Status: COMPLETED | OUTPATIENT
Start: 2025-07-18 | End: 2025-07-18

## 2025-07-18 RX ORDER — METHYLPREDNISOLONE ACETATE 80 MG/ML
4 INJECTION, SUSPENSION INTRA-ARTICULAR; INTRALESIONAL; INTRAMUSCULAR; SOFT TISSUE
Refills: 0 | Status: DISCONTINUED | OUTPATIENT
Start: 2025-07-19 | End: 2025-07-19

## 2025-07-18 RX ORDER — NALOXONE HYDROCHLORIDE 0.4 MG/ML
1 INJECTION, SOLUTION INTRAMUSCULAR; INTRAVENOUS; SUBCUTANEOUS
Qty: 1 | Refills: 0
Start: 2025-07-18

## 2025-07-18 RX ORDER — CYCLOBENZAPRINE HYDROCHLORIDE 15 MG/1
1 CAPSULE, EXTENDED RELEASE ORAL
Refills: 0 | DISCHARGE

## 2025-07-18 RX ORDER — METHYLPREDNISOLONE ACETATE 80 MG/ML
INJECTION, SUSPENSION INTRA-ARTICULAR; INTRALESIONAL; INTRAMUSCULAR; SOFT TISSUE
Refills: 0 | Status: DISCONTINUED | OUTPATIENT
Start: 2025-07-18 | End: 2025-07-19

## 2025-07-18 RX ORDER — TRAMADOL HYDROCHLORIDE 50 MG/1
1 TABLET, FILM COATED ORAL
Qty: 21 | Refills: 0
Start: 2025-07-18 | End: 2025-07-24

## 2025-07-18 RX ORDER — POLYETHYLENE GLYCOL 3350 17 G/17G
17 POWDER, FOR SOLUTION ORAL
Qty: 0 | Refills: 0 | DISCHARGE
Start: 2025-07-18

## 2025-07-18 RX ORDER — SENNA 187 MG
2 TABLET ORAL
Qty: 0 | Refills: 0 | DISCHARGE
Start: 2025-07-18

## 2025-07-18 RX ORDER — METHYLPREDNISOLONE ACETATE 80 MG/ML
4 INJECTION, SUSPENSION INTRA-ARTICULAR; INTRALESIONAL; INTRAMUSCULAR; SOFT TISSUE
Qty: 1 | Refills: 0
Start: 2025-07-18

## 2025-07-18 RX ORDER — GABAPENTIN 400 MG/1
1 CAPSULE ORAL
Qty: 42 | Refills: 0
Start: 2025-07-18 | End: 2025-07-31

## 2025-07-18 RX ORDER — METHYLPREDNISOLONE ACETATE 80 MG/ML
24 INJECTION, SUSPENSION INTRA-ARTICULAR; INTRALESIONAL; INTRAMUSCULAR; SOFT TISSUE ONCE
Refills: 0 | Status: COMPLETED | OUTPATIENT
Start: 2025-07-18 | End: 2025-07-18

## 2025-07-18 RX ORDER — CYCLOBENZAPRINE HYDROCHLORIDE 15 MG/1
1 CAPSULE, EXTENDED RELEASE ORAL
Qty: 21 | Refills: 0
Start: 2025-07-18 | End: 2025-07-24

## 2025-07-18 RX ORDER — OXYCODONE HYDROCHLORIDE 30 MG/1
1 TABLET ORAL
Qty: 28 | Refills: 0
Start: 2025-07-18 | End: 2025-07-24

## 2025-07-18 RX ORDER — ACETAMINOPHEN 500 MG/5ML
3 LIQUID (ML) ORAL
Qty: 0 | Refills: 0 | DISCHARGE
Start: 2025-07-18

## 2025-07-18 RX ADMIN — Medication 975 MILLIGRAM(S): at 01:41

## 2025-07-18 RX ADMIN — OXYCODONE HYDROCHLORIDE 10 MILLIGRAM(S): 30 TABLET ORAL at 06:44

## 2025-07-18 RX ADMIN — Medication 10 MILLIGRAM(S): at 13:28

## 2025-07-18 RX ADMIN — Medication 975 MILLIGRAM(S): at 11:32

## 2025-07-18 RX ADMIN — Medication 975 MILLIGRAM(S): at 02:00

## 2025-07-18 RX ADMIN — Medication 1000 MILLIGRAM(S): at 05:29

## 2025-07-18 RX ADMIN — Medication 975 MILLIGRAM(S): at 12:32

## 2025-07-18 RX ADMIN — Medication 1000 MILLIGRAM(S): at 19:13

## 2025-07-18 RX ADMIN — GABAPENTIN 100 MILLIGRAM(S): 400 CAPSULE ORAL at 13:27

## 2025-07-18 RX ADMIN — SODIUM CHLORIDE 100 MILLILITER(S): 9 INJECTION, SOLUTION INTRAVENOUS at 01:42

## 2025-07-18 RX ADMIN — OXYCODONE HYDROCHLORIDE 10 MILLIGRAM(S): 30 TABLET ORAL at 10:27

## 2025-07-18 RX ADMIN — OXYCODONE HYDROCHLORIDE 10 MILLIGRAM(S): 30 TABLET ORAL at 11:32

## 2025-07-18 RX ADMIN — GABAPENTIN 100 MILLIGRAM(S): 400 CAPSULE ORAL at 05:28

## 2025-07-18 RX ADMIN — Medication 1 TABLET(S): at 13:28

## 2025-07-18 RX ADMIN — Medication 975 MILLIGRAM(S): at 19:40

## 2025-07-18 RX ADMIN — OXYCODONE HYDROCHLORIDE 10 MILLIGRAM(S): 30 TABLET ORAL at 05:44

## 2025-07-18 RX ADMIN — Medication 975 MILLIGRAM(S): at 20:20

## 2025-07-18 RX ADMIN — METHYLPREDNISOLONE ACETATE 24 MILLIGRAM(S): 80 INJECTION, SUSPENSION INTRA-ARTICULAR; INTRALESIONAL; INTRAMUSCULAR; SOFT TISSUE at 11:30

## 2025-07-18 NOTE — PROGRESS NOTE ADULT - SUBJECTIVE AND OBJECTIVE BOX
ORTHOPEDIC PROGRESS NOTE    Overnight events: None    SUBJECTIVE: Pt seen and examined at bedside. Patient is doing well, no acute complaints this AM. Pain is controlled with medication      OBJECTIVE:  Vital Signs Last 24 Hrs  T(C): 36.8 (17 Jul 2025 05:50), Max: 37.2 (16 Jul 2025 17:20)  T(F): 98.2 (17 Jul 2025 05:50), Max: 99 (16 Jul 2025 17:20)  HR: 77 (17 Jul 2025 05:50) (62 - 98)  BP: 112/63 (17 Jul 2025 05:50) (107/62 - 134/61)  BP(mean): --  RR: 18 (17 Jul 2025 05:50) (16 - 18)  SpO2: 97% (17 Jul 2025 05:50) (95% - 100%)    Parameters below as of 17 Jul 2025 05:50  Patient On (Oxygen Delivery Method): room air          07-15-25 @ 07:01  -  07-16-25 @ 07:00  --------------------------------------------------------  IN: 500 mL / OUT: 2040 mL / NET: -1540 mL    07-16-25 @ 07:01  -  07-17-25 @ 06:40  --------------------------------------------------------  IN: 0 mL / OUT: 1787 mL / NET: -1787 mL        Physical Examination:  GEN: NAD, resting quietly  PULM: symmetric chest rise bilaterally, no increased WOB  ABD: nondistended  EXTR:   Spine:  Dressing C/D/I  HV drain in place with SS output  Motor:                 L2             L3             L4               L5            S1  R         5/5           5/5          5/5             5/5           5/5  L          5/5          5/5           5/5             5/5           5/5    Sensory:             L2          L3         L4      L5       S1         (0=absent, 1=impaired, 2=normal, NT=not testable)  R         2            2            2        2        2  L          2            2           2        2         2      LABS:                        11.9   13.31 )-----------( 272      ( 16 Jul 2025 05:15 )             35.5       07-16    140  |  104  |  11  ----------------------------<  146[H]  4.0   |  24  |  0.54    Ca    8.9      16 Jul 2025 05:15        
Plastic Surgery Progress Note (pg [facility]: [id number], [facility]: [phone])    SUBJECTIVE  The patient was seen and examined. No acute events overnight.    OBJECTIVE  ___________________________________________________  VITAL SIGNS / I&O's   Vital Signs Last 24 Hrs  T(C): 36.4 (17 Jul 2025 17:07), Max: 37.1 (16 Jul 2025 21:20)  T(F): 97.6 (17 Jul 2025 17:07), Max: 98.8 (16 Jul 2025 21:20)  HR: 87 (17 Jul 2025 17:07) (73 - 98)  BP: 103/51 (17 Jul 2025 17:07) (73/43 - 134/61)  BP(mean): --  RR: 16 (17 Jul 2025 17:07) (16 - 18)  SpO2: 97% (17 Jul 2025 17:07) (97% - 100%)    Parameters below as of 17 Jul 2025 17:07  Patient On (Oxygen Delivery Method): room air          16 Jul 2025 07:01  -  17 Jul 2025 07:00  --------------------------------------------------------  IN:  Total IN: 0 mL    OUT:    Bulb (mL): 37 mL    Voided (mL): 1750 mL  Total OUT: 1787 mL    Total NET: -1787 mL      17 Jul 2025 07:01  -  17 Jul 2025 18:50  --------------------------------------------------------  IN:    Lactated Ringers: 300 mL    Lactated Ringers Bolus: 900 mL  Total IN: 1200 mL    OUT:    Bulb (mL): 0 mL    Voided (mL): 900 mL  Total OUT: 900 mL    Total NET: 300 mL        ___________________________________________________  PHYSICAL EXAM    -- CONSTITUTIONAL: NAD, lying in bed  -- NEURO: Awake, alert  -- NECK: Soft, no asymmetry  -- PULM: Non-labored respirations, equal chest rise bilaterally  -- BACK: Soft, flat. Dressing c/d/i. JPx1 SS.    ___________________________________________________  LABS                        11.1   13.83 )-----------( 261      ( 17 Jul 2025 06:06 )             34.3     17 Jul 2025 06:06    139    |  102    |  13     ----------------------------<  121    4.0     |  27     |  0.61     Ca    8.6        17 Jul 2025 06:06        CAPILLARY BLOOD GLUCOSE            Urinalysis Basic - ( 17 Jul 2025 06:06 )    Color: x / Appearance: x / SG: x / pH: x  Gluc: 121 mg/dL / Ketone: x  / Bili: x / Urobili: x   Blood: x / Protein: x / Nitrite: x   Leuk Esterase: x / RBC: x / WBC x   Sq Epi: x / Non Sq Epi: x / Bacteria: x      ___________________________________________________  MICRO  Recent Cultures:    ___________________________________________________  MEDICATIONS  (STANDING):  acetaminophen     Tablet .. 975 milliGRAM(s) Oral every 8 hours  gabapentin 100 milliGRAM(s) Oral three times a day  hydrochlorothiazide 12.5 milliGRAM(s) Oral two times a day  lactated ringers. 1000 milliLiter(s) (100 mL/Hr) IV Continuous <Continuous>  losartan 150 milliGRAM(s) Oral daily  mesalamine ER Capsule 1000 milliGRAM(s) Oral two times a day  multivitamin 1 Tablet(s) Oral daily  pantoprazole    Tablet 40 milliGRAM(s) Oral before breakfast  polyethylene glycol 3350 17 Gram(s) Oral at bedtime  senna 2 Tablet(s) Oral at bedtime    MEDICATIONS  (PRN):  ondansetron   Disintegrating Tablet 4 milliGRAM(s) Oral every 6 hours PRN Nausea and/or Vomiting  oxyCODONE    IR 10 milliGRAM(s) Oral every 4 hours PRN Severe Pain (7 - 10)  oxyCODONE    IR 5 milliGRAM(s) Oral every 4 hours PRN Moderate Pain (4 - 6)  tiZANidine 4 milliGRAM(s) Oral every 8 hours PRN Muscle Spasm  traMADol 50 milliGRAM(s) Oral every 8 hours PRN Mild Pain (1 - 3)  
  ORTHOPEDIC PROGRESS NOTE    Overnight events: None    SUBJECTIVE: Pt seen and examined at bedside. Patient is doing well, no acute complaints this AM. Pain is controlled with medication      OBJECTIVE:  Vital Signs Last 24 Hrs  T(C): 36.6 (16 Jul 2025 01:34), Max: 36.7 (15 Jul 2025 11:50)  T(F): 97.8 (16 Jul 2025 01:34), Max: 98.1 (15 Jul 2025 11:50)  HR: 69 (16 Jul 2025 01:34) (69 - 101)  BP: 126/54 (16 Jul 2025 01:34) (103/59 - 141/65)  BP(mean): 76 (15 Jul 2025 15:00) (71 - 87)  RR: 18 (16 Jul 2025 01:34) (14 - 20)  SpO2: 95% (16 Jul 2025 01:34) (93% - 100%)    Parameters below as of 16 Jul 2025 01:34  Patient On (Oxygen Delivery Method): room air    07-15-25 @ 07:01  -  07-16-25 @ 06:16  --------------------------------------------------------  IN: 500 mL / OUT: 1510 mL / NET: -1010 mL    Physical Examination:  GEN: NAD, resting quietly  PULM: symmetric chest rise bilaterally, no increased WOB  ABD: nondistended  EXTR:   Spine:  Dressing C/D/I  RENE drain in place with SS output  Motor:                   C5                C6              C7               C8           T1   R            5/5                5/5            5/5             5/5          5/5  L             5/5               5/5             5/5             5/5          5/5                L2             L3             L4               L5            S1  R         5/5           5/5          5/5             5/5           5/5  L          5/5          5/5           5/5             5/5           5/5    Sensory:            C5         C6         C7      C8       T1        (0=absent, 1=impaired, 2=normal, NT=not testable)  R         2            2           2        2         2  L          2            2           2        2         2               L2          L3         L4      L5       S1         (0=absent, 1=impaired, 2=normal, NT=not testable)  R         2            2            2        2        2  L          2            2           2        2         2      LABS:                        11.9   13.31 )-----------( 272      ( 16 Jul 2025 05:15 )             35.5       07-15    137  |  100  |  9   ----------------------------<  125[H]  3.9   |  22  |  0.65    Ca    8.3[L]      15 Jul 2025 12:03        
  ORTHOPEDIC PROGRESS NOTE    Overnight events: None    SUBJECTIVE: Pt seen and examined at bedside. Patient is doing well, no acute complaints this AM. Pain is controlled with medication. Patient denies dizziness this AM, but endorsing LLE radiculopathy.      OBJECTIVE:  Vital Signs Last 24 Hrs  T(C): 36.9 (18 Jul 2025 01:23), Max: 37.1 (17 Jul 2025 15:14)  T(F): 98.4 (18 Jul 2025 01:23), Max: 98.8 (17 Jul 2025 15:14)  HR: 97 (18 Jul 2025 01:23) (73 - 97)  BP: 136/61 (18 Jul 2025 01:23) (73/43 - 136/61)  BP(mean): --  RR: 17 (18 Jul 2025 01:23) (16 - 17)  SpO2: 98% (18 Jul 2025 01:23) (97% - 100%)    Parameters below as of 18 Jul 2025 01:23  Patient On (Oxygen Delivery Method): room air          07-16-25 @ 07:01  -  07-17-25 @ 07:00  --------------------------------------------------------  IN: 0 mL / OUT: 1787 mL / NET: -1787 mL    07-17-25 @ 07:01  -  07-18-25 @ 06:14  --------------------------------------------------------  IN: 1300 mL / OUT: 900 mL / NET: 400 mL        Physical Examination:  GEN: NAD, resting quietly  PULM: symmetric chest rise bilaterally, no increased WOB  ABD: nondistended  Spine:  Dressing saturated, plastics called to change    Motor:                 L2             L3             L4               L5            S1  R         5/5           5/5          5/5             5/5           5/5  L          4/5          5/5           5/5             5/5           5/5    Sensory:               L2          L3         L4      L5       S1         (0=absent, 1=impaired, 2=normal, NT=not testable)  R         2            2            2        2        2  L          2            2           2        2         2        LABS:                        11.1   13.83 )-----------( 261      ( 17 Jul 2025 06:06 )             34.3       07-17    139  |  102  |  13  ----------------------------<  121[H]  4.0   |  27  |  0.61    Ca    8.6      17 Jul 2025 06:06        
Orthopedics Post-Op Check:  Patient was seen and examined at bedside. Denies CP/SOB/Dizziness, N/V/D, HA. Pt states prior to surgery pain was located in RLE.  States pain is controlled with no radiation to extremities at this time.    Vital Signs Last 24 Hrs  T(C): 36.7 (15 Jul 2025 12:00), Max: 36.7 (15 Jul 2025 11:50)  T(F): 98.1 (15 Jul 2025 12:00), Max: 98.1 (15 Jul 2025 11:50)  HR: 88 (15 Jul 2025 13:00) (87 - 101)  BP: 107/55 (15 Jul 2025 13:00) (104/63 - 141/65)  BP(mean): 71 (15 Jul 2025 13:00) (71 - 87)  RR: 20 (15 Jul 2025 13:00) (14 - 20)  SpO2: 98% (15 Jul 2025 13:00) (93% - 98%)    Parameters below as of 15 Jul 2025 13:00  Patient On (Oxygen Delivery Method): room air    Labs:             12.0   9.05  )-----------( 277      ( 15 Jul 2025 12:03 )             36.8       07-15    137  |  100  |  9   ----------------------------<  125[H]  3.9   |  22  |  0.65    Ca    8.3[L]      15 Jul 2025 12:03    Physical Exam:  Gen: NAD  Back: Dressing C/D/I, RENE (sewn) in place  BL LE:   Motor intact 5/5 EHL/FHL/TA/GS. Sensation is grossly intact.   Compartments are soft, extremities are warm, DP 2+        
Plastic Surgery Progress Note (pg [facility]: [id number], [facility]: [phone])    SUBJECTIVE  The patient was seen and examined. No acute events overnight.     OBJECTIVE  ___________________________________________________  VITAL SIGNS / I&O's   Vital Signs Last 24 Hrs  T(C): 37.2 (18 Jul 2025 09:30), Max: 37.2 (18 Jul 2025 09:30)  T(F): 99 (18 Jul 2025 09:30), Max: 99 (18 Jul 2025 09:30)  HR: 91 (18 Jul 2025 12:28) (73 - 97)  BP: 126/40 (18 Jul 2025 12:28) (95/43 - 136/61)  BP(mean): --  RR: 18 (18 Jul 2025 12:28) (16 - 18)  SpO2: 99% (18 Jul 2025 12:28) (94% - 100%)    Parameters below as of 18 Jul 2025 12:28  Patient On (Oxygen Delivery Method): room air          17 Jul 2025 07:01  -  18 Jul 2025 07:00  --------------------------------------------------------  IN:    Lactated Ringers: 400 mL    Lactated Ringers Bolus: 900 mL  Total IN: 1300 mL    OUT:    Bulb (mL): 0 mL    Voided (mL): 2200 mL  Total OUT: 2200 mL    Total NET: -900 mL        ___________________________________________________  PHYSICAL EXAM      -- CONSTITUTIONAL: NAD, lying in bed  -- NEURO: Awake, alert  -- NECK: Soft, no asymmetry  -- PULM: Non-labored respirations, equal chest rise bilaterally  -- BACK: Soft, flat. Dressing c/d/i  ___________________________________________________  LABS                        11.2   11.71 )-----------( 285      ( 18 Jul 2025 07:53 )             34.9     18 Jul 2025 07:53    139    |  100    |  10     ----------------------------<  100    3.7     |  29     |  0.54     Ca    8.6        18 Jul 2025 07:53        CAPILLARY BLOOD GLUCOSE            Urinalysis Basic - ( 18 Jul 2025 07:53 )    Color: x / Appearance: x / SG: x / pH: x  Gluc: 100 mg/dL / Ketone: x  / Bili: x / Urobili: x   Blood: x / Protein: x / Nitrite: x   Leuk Esterase: x / RBC: x / WBC x   Sq Epi: x / Non Sq Epi: x / Bacteria: x      ___________________________________________________  MICRO  Recent Cultures:    ___________________________________________________  MEDICATIONS  (STANDING):  acetaminophen     Tablet .. 975 milliGRAM(s) Oral every 8 hours  gabapentin 100 milliGRAM(s) Oral three times a day  hydrochlorothiazide 12.5 milliGRAM(s) Oral two times a day  lactated ringers. 1000 milliLiter(s) (100 mL/Hr) IV Continuous <Continuous>  losartan 150 milliGRAM(s) Oral daily  mesalamine ER Capsule 1000 milliGRAM(s) Oral two times a day  methylPREDNISolone   Oral   multivitamin 1 Tablet(s) Oral daily  pantoprazole    Tablet 40 milliGRAM(s) Oral before breakfast  polyethylene glycol 3350 17 Gram(s) Oral at bedtime  senna 2 Tablet(s) Oral at bedtime    MEDICATIONS  (PRN):  ondansetron   Disintegrating Tablet 4 milliGRAM(s) Oral every 6 hours PRN Nausea and/or Vomiting  oxyCODONE    IR 10 milliGRAM(s) Oral every 4 hours PRN Severe Pain (7 - 10)  oxyCODONE    IR 5 milliGRAM(s) Oral every 4 hours PRN Moderate Pain (4 - 6)  tiZANidine 4 milliGRAM(s) Oral every 8 hours PRN Muscle Spasm  traMADol 50 milliGRAM(s) Oral every 8 hours PRN Mild Pain (1 - 3)

## 2025-07-18 NOTE — PROGRESS NOTE ADULT - ASSESSMENT
· Assessment	  ASSESSMENT/PLAN:   BRIAN RÍOS is a 69yFemale s/p PSF with closure by PRS.     - Dressing replaced today with gauze and tege - keep dry and intact  - Pain control  - Advance diet as tolerated, ensure pt taking adequate protein  - Appreciate rest of care per primary team    Kota Pro - PGY1  Plastic Surgery   ----------------------------  [facility] Pager: [phone]  [facility] Pager: [phone]  Available on Teams

## 2025-07-18 NOTE — PROGRESS NOTE ADULT - ASSESSMENT
A/P: Patient is a 69y y/o Female s/p L4-S1 lami and L-4 PSF,  on 7/15. Recovering well on the floor    -Pain control/analgesia  -Incentive spirometry  -Venodynes  -PT/OT/WBAT  - Cleared for home discharge today  -Notify orthopedics with any questions    For all questions related to patient care, please reach out to the on-call team via the pager.     Teresa Trejo, PGY 4  Orthopaedic Surgery  [facility] [phone]  [facility] [phone]  [facility] [id number]   A/P: Patient is a 69y y/o Female s/p L4-S1 lami and L-4 PSF,  on 7/15. Recovering well on the floor    -Pain control/analgesia  -Incentive spirometry  -Venodynes  -PT/OT/WBAT  - Cleared for home discharge today  -Notify orthopedics with any questions    For all questions related to patient care, please reach out to the on-call team via the pager.     Teresa Trejo, PGY 4  Orthopaedic Surgery  [facility] [phone]  [facility] [phone]  [facility] [phone]    Patient doing well with PT, some return of left leg pain, able to ambulate. Incisional pain controlled. Discussed with the above, discussed steroids. Otherwise, as per above, doing well. Dr. Mohit Marquez.

## 2025-07-22 ENCOUNTER — NON-APPOINTMENT (OUTPATIENT)
Age: 70
End: 2025-07-22

## 2025-07-25 ENCOUNTER — NON-APPOINTMENT (OUTPATIENT)
Age: 70
End: 2025-07-25

## 2025-07-28 ENCOUNTER — APPOINTMENT (OUTPATIENT)
Dept: ORTHOPEDIC SURGERY | Facility: CLINIC | Age: 70
End: 2025-07-28
Payer: MEDICARE

## 2025-07-28 VITALS — BODY MASS INDEX: 34.55 KG/M2 | HEIGHT: 60 IN | WEIGHT: 176 LBS

## 2025-07-28 DIAGNOSIS — M48.07 SPINAL STENOSIS, LUMBOSACRAL REGION: ICD-10-CM

## 2025-07-28 PROBLEM — Z87.39 PERSONAL HISTORY OF OTHER DISEASES OF THE MUSCULOSKELETAL SYSTEM AND CONNECTIVE TISSUE: Chronic | Status: ACTIVE | Noted: 2025-06-25

## 2025-07-28 PROBLEM — M43.16 SPONDYLOLISTHESIS, LUMBAR REGION: Chronic | Status: ACTIVE | Noted: 2025-06-25

## 2025-07-28 PROBLEM — Z87.19 PERSONAL HISTORY OF OTHER DISEASES OF THE DIGESTIVE SYSTEM: Chronic | Status: ACTIVE | Noted: 2025-06-25

## 2025-07-28 PROCEDURE — 99024 POSTOP FOLLOW-UP VISIT: CPT

## 2025-07-28 PROCEDURE — 72100 X-RAY EXAM L-S SPINE 2/3 VWS: CPT

## 2025-08-25 ENCOUNTER — APPOINTMENT (OUTPATIENT)
Dept: ORTHOPEDIC SURGERY | Facility: CLINIC | Age: 70
End: 2025-08-25
Payer: MEDICARE

## 2025-08-25 VITALS
HEIGHT: 60 IN | HEART RATE: 103 BPM | OXYGEN SATURATION: 98 % | SYSTOLIC BLOOD PRESSURE: 136 MMHG | DIASTOLIC BLOOD PRESSURE: 79 MMHG

## 2025-08-25 DIAGNOSIS — M48.07 SPINAL STENOSIS, LUMBOSACRAL REGION: ICD-10-CM

## 2025-08-25 DIAGNOSIS — M43.16 SPONDYLOLISTHESIS, LUMBAR REGION: ICD-10-CM

## 2025-08-25 PROCEDURE — 72100 X-RAY EXAM L-S SPINE 2/3 VWS: CPT

## 2025-08-25 PROCEDURE — 99024 POSTOP FOLLOW-UP VISIT: CPT

## 2025-08-25 RX ORDER — TRAMADOL HYDROCHLORIDE 50 MG/1
50 TABLET, COATED ORAL
Qty: 20 | Refills: 0 | Status: ACTIVE | COMMUNITY
Start: 2025-08-25 | End: 1900-01-01

## 2025-08-25 RX ORDER — CYCLOBENZAPRINE HYDROCHLORIDE 5 MG/1
5 TABLET, FILM COATED ORAL
Qty: 28 | Refills: 0 | Status: ACTIVE | COMMUNITY
Start: 2025-08-25 | End: 1900-01-01

## 2025-08-25 RX ORDER — GABAPENTIN 100 MG/1
100 CAPSULE ORAL TWICE DAILY
Qty: 60 | Refills: 0 | Status: ACTIVE | COMMUNITY
Start: 2025-08-25 | End: 1900-01-01

## (undated) DEVICE — SUT NYLON 2-0 18" FS

## (undated) DEVICE — VENODYNE/SCD SLEEVE CALF MEDIUM

## (undated) DEVICE — SUT STRATAFIX SPIRAL MONOCRYL PLUS 3-0 30CM PS UNDYED

## (undated) DEVICE — Device

## (undated) DEVICE — DRAPE LIGHT HANDLE COVER (GREEN)

## (undated) DEVICE — SUT PDS II 0 36" CT-1

## (undated) DEVICE — TAPE SILK 3"

## (undated) DEVICE — PROTECTOR HEEL / ELBOW FLUFFY

## (undated) DEVICE — POSITIONER PATIENT SAFETY STRAP 3X60"

## (undated) DEVICE — DRAPE BACK TABLE COVER 44X90"

## (undated) DEVICE — POSITIONER CUSHION INSERT PRONE VIEW LG

## (undated) DEVICE — BLADE SURGICAL #10 CARBON

## (undated) DEVICE — SOL IRR BAG NS 0.9% 1000ML

## (undated) DEVICE — SOL IRR POUR H2O 500ML

## (undated) DEVICE — DRSG CURITY GAUZE SPONGE 4 X 4" 12-PLY

## (undated) DEVICE — DRAIN JACKSON PRATT 3 SPRING RESERVOIR W 10FR PVC DRAIN

## (undated) DEVICE — DRAPE LAPAROTOMY W VELCRO CORD TABS

## (undated) DEVICE — ELCTR BOVIE PENCIL SMOKE EVACUATION

## (undated) DEVICE — POSITIONER STRAP ARMBOARD VELCRO TS-30

## (undated) DEVICE — ELCTR BOVIE TIP BLADE INSULATED 2.75" EDGE

## (undated) DEVICE — DRSG DERMABOND PRINEO 42CM

## (undated) DEVICE — ELCTR SUBDERMAL NDL CLASSIC 1.5M X 59" (6 COLOR)

## (undated) DEVICE — SUT VICRYL PLUS 2-0 27" FS-1 UNDYED

## (undated) DEVICE — SUT MONOCRYL 3-0 27" PS-2 UNDYED

## (undated) DEVICE — SPONGE X-RAY 4X8"

## (undated) DEVICE — NDL SPINAL 18G X 3.5" (PINK)

## (undated) DEVICE — PACK NEURO

## (undated) DEVICE — STRYKER BONE MILL & MEDIUM BLADE

## (undated) DEVICE — DRAIN RESERVOIR FOR JACKSON PRATT 100CC CARDINAL

## (undated) DEVICE — SUT VICRYL 1 36" CTX UNDYED

## (undated) DEVICE — DRAIN BLAKE 15FR BARD CHANNEL

## (undated) DEVICE — MIDAS REX MR7 LUBRICANT DIFFUSER CARTRIDGE

## (undated) DEVICE — DRSG BENZOIN 0.6CC

## (undated) DEVICE — DRAPE 1/2 SHEET 40X57"

## (undated) DEVICE — DRSG TEGADERM 1.75X1.75"

## (undated) DEVICE — BIPOLAR FORCEP STRYKER STANDARD 9" X 1MM (YELLOW)

## (undated) DEVICE — NDL HYPO SAFE 21G X 1.5" (GREEN)

## (undated) DEVICE — ELCTR BOVIE PENCIL BLADE 10FT

## (undated) DEVICE — DRAPE SURGICAL #1010

## (undated) DEVICE — DRAPE MAYO STAND 30"

## (undated) DEVICE — WARMING BLANKET FULL ADULT

## (undated) DEVICE — ELCTR GROUNDING PAD ADULT COVIDIEN

## (undated) DEVICE — SUT STRATAFIX SPIRAL PDS PLUS 1 18" OS-8

## (undated) DEVICE — ELCTR SUBDERMAL NDL 27G X 1/2" WITH TWISTED PAIR

## (undated) DEVICE — DRSG BIOPATCH DISK W CHG 1" W 4.0MM HOLE

## (undated) DEVICE — TUBING FOR SMOKE EVACUATOR (PURPLE END)

## (undated) DEVICE — PREP DURAPREP 26CC

## (undated) DEVICE — DRSG TELFA 3 X 8

## (undated) DEVICE — SUT VICRYL PLUS 0 27" OS-6 UNDYED

## (undated) DEVICE — DRSG TEGADERM 4 X 4.75"

## (undated) DEVICE — MIDAS REX LEGEND BALL FLUTED SM BORE 4.0MM X 10CM

## (undated) DEVICE — LIJ-KMEDIC KERRISON RONGUER: Type: DURABLE MEDICAL EQUIPMENT

## (undated) DEVICE — FOLEY TRAY 16FR 5CC LF UMETER CLOSED

## (undated) DEVICE — DRSG STERISTRIPS 0.5 X 4"

## (undated) DEVICE — DRAPE 3/4 SHEET 52X76"

## (undated) DEVICE — MIDAS REX MR8 BALL FLUTED SM BORE 4MM X 10CM

## (undated) DEVICE — ELCTR 4-DISC 20MM 49" (RED, BLUE, GREEN, BLACK)

## (undated) DEVICE — GOWN XL

## (undated) DEVICE — ELCTR PEDICLE SCREW PROBE 3MM BALL 1.8MM X 100MM

## (undated) DEVICE — SPONGE DISSECTOR PEANUT

## (undated) DEVICE — SUT MONOCRYL 3-0 18" PS-2 UNDYED